# Patient Record
Sex: FEMALE | Race: WHITE | Employment: FULL TIME | URBAN - METROPOLITAN AREA
[De-identification: names, ages, dates, MRNs, and addresses within clinical notes are randomized per-mention and may not be internally consistent; named-entity substitution may affect disease eponyms.]

---

## 2017-02-09 ENCOUNTER — OFFICE VISIT (OUTPATIENT)
Dept: INTERNAL MEDICINE CLINIC | Facility: CLINIC | Age: 57
End: 2017-02-09

## 2017-02-09 VITALS
HEART RATE: 100 BPM | WEIGHT: 150 LBS | SYSTOLIC BLOOD PRESSURE: 100 MMHG | HEIGHT: 60 IN | RESPIRATION RATE: 20 BRPM | BODY MASS INDEX: 29.45 KG/M2 | DIASTOLIC BLOOD PRESSURE: 70 MMHG | TEMPERATURE: 99 F

## 2017-02-09 DIAGNOSIS — R05.9 COUGH: Primary | ICD-10-CM

## 2017-02-09 PROCEDURE — 99213 OFFICE O/P EST LOW 20 MIN: CPT | Performed by: INTERNAL MEDICINE

## 2017-02-09 RX ORDER — HYDROCODONE BITARTRATE AND HOMATROPINE METHYLBROMIDE ORAL SOLUTION 5; 1.5 MG/5ML; MG/5ML
2.5 LIQUID ORAL EVERY 6 HOURS PRN
Qty: 180 ML | Refills: 0 | Status: SHIPPED | OUTPATIENT
Start: 2017-02-09 | End: 2017-02-19

## 2017-02-09 RX ORDER — BENZONATATE 100 MG/1
100 CAPSULE ORAL 3 TIMES DAILY PRN
Qty: 20 CAPSULE | Refills: 0 | Status: SHIPPED | OUTPATIENT
Start: 2017-02-09 | End: 2018-05-23 | Stop reason: ALTCHOICE

## 2017-02-09 NOTE — PROGRESS NOTES
Nichelle Justice is a 55year old female.   HPI:   CC:sore throat /hoarseness for 3 days  Cough loose  Since 2 days  No fever  Pt taking aspirin  Using mucinex OTC  Chicken broth onion and garlic    ALL:  No Known Allergies          Current Outpatient Pre Used                        Alcohol Use: Yes                Comment: 1 drink per week    Family History   Problem Relation Age of Onset   • Heart Attack Father    • High Cholesterol Brother    • Diabetes Brother    • Heart Surgery Sister      heart valve r

## 2017-10-06 RX ORDER — LEVOTHYROXINE SODIUM 88 UG/1
TABLET ORAL
Qty: 90 TABLET | Refills: 0 | Status: SHIPPED | OUTPATIENT
Start: 2017-10-06 | End: 2018-01-11

## 2017-10-07 ENCOUNTER — OFFICE VISIT (OUTPATIENT)
Dept: INTERNAL MEDICINE CLINIC | Facility: CLINIC | Age: 57
End: 2017-10-07

## 2017-10-07 VITALS
SYSTOLIC BLOOD PRESSURE: 104 MMHG | BODY MASS INDEX: 29.64 KG/M2 | WEIGHT: 151 LBS | DIASTOLIC BLOOD PRESSURE: 64 MMHG | HEIGHT: 60 IN | HEART RATE: 72 BPM

## 2017-10-07 DIAGNOSIS — Z00.00 ROUTINE PHYSICAL EXAMINATION: Primary | ICD-10-CM

## 2017-10-07 DIAGNOSIS — Z12.11 COLON CANCER SCREENING: ICD-10-CM

## 2017-10-07 PROCEDURE — 99396 PREV VISIT EST AGE 40-64: CPT | Performed by: INTERNAL MEDICINE

## 2017-10-07 NOTE — PROGRESS NOTES
HPI:   Rosa Elena Sprague is a 55year old female who presents for a complete physical exam.  Lots of stress/feeling sad at times  Taking herba product rhodon?l for mood  Feeling sadness with mom Nov 2016  Maternal aunt August 13, 2017  Paternal uncle kelvin right shoulder   • Hypercholesterolemia    • Hypothyroidism    • Liver lesion 7/13/2011    small hyperechoic lesion w/in the liver    • Loose body, joint 6/28/2009   • Midepigastric pain    • Plantar fasciitis    • Premature menopause    • Primary localize gyne  MUSCULOSKELETAL: denies back pain, no leg pain  NEURO: denies headaches or dizziness  PSYCHE: stable mood feels sad sometime  HEMATOLOGIC: denies hx of anemia  ENDOCRINE: no diabetes or thyroid   ALL/ASTHMA: + hx of allergy     EXAM:   /64 (BP

## 2017-10-27 RX ORDER — PRAVASTATIN SODIUM 40 MG
TABLET ORAL
Qty: 90 TABLET | Refills: 0 | Status: SHIPPED | OUTPATIENT
Start: 2017-10-27 | End: 2018-03-13

## 2017-10-27 NOTE — TELEPHONE ENCOUNTER
Left detailed message for patient, per HIPPA ok. Advised of prescription approval and reminder of labs.

## 2017-10-31 PROBLEM — G57.61 NEUROMA OF SECOND INTERSPACE OF RIGHT FOOT: Status: ACTIVE | Noted: 2017-10-31

## 2017-11-20 RX ORDER — PRAVASTATIN SODIUM 40 MG
40 TABLET ORAL
Qty: 90 TABLET | Refills: 0 | Status: CANCELLED | OUTPATIENT
Start: 2017-11-20

## 2017-11-20 RX ORDER — PRAVASTATIN SODIUM 40 MG
TABLET ORAL
Qty: 90 TABLET | Refills: 0 | OUTPATIENT
Start: 2017-11-20

## 2018-01-11 RX ORDER — LEVOTHYROXINE SODIUM 88 UG/1
TABLET ORAL
Qty: 90 TABLET | Refills: 0 | Status: SHIPPED | OUTPATIENT
Start: 2018-01-11 | End: 2018-03-13

## 2018-01-11 NOTE — TELEPHONE ENCOUNTER
Left detailed message on pt's mobile voice mail advising that curren dose of Levothyroxine 88 mcg is correct. Advised that most recent lab for thyroid done 12/22/17 was normal and dosage change was indicated. Pt asked to call back if questions.

## 2018-01-11 NOTE — TELEPHONE ENCOUNTER
Was patient advised to contact pharmacy directly?:  Yes, patient requested to double check lab results to make sure its the same dose.     Is patient out of meds or supply very low?:  Completely out    Medication Requested:  LEVOTHYROXINE SODIUM 88 MCG Oral

## 2018-03-13 RX ORDER — LEVOTHYROXINE SODIUM 88 UG/1
TABLET ORAL
Qty: 90 TABLET | Refills: 1 | Status: SHIPPED | OUTPATIENT
Start: 2018-03-13 | End: 2018-09-13

## 2018-03-13 RX ORDER — PRAVASTATIN SODIUM 40 MG
40 TABLET ORAL
Qty: 90 TABLET | Refills: 1 | Status: SHIPPED | OUTPATIENT
Start: 2018-03-13 | End: 2018-09-13

## 2018-03-13 NOTE — TELEPHONE ENCOUNTER
Pharmacy was changed to correct one by PSR. Will put through requested medications through the refill pool.

## 2018-03-13 NOTE — TELEPHONE ENCOUNTER
[facility] due to Insurance. Therefore she was only able to get 30 days worth last time instead of 90. Can we please send her levothyroxine and pravastatin to [facility] for 90 days with refills. Thanks.

## 2018-05-23 ENCOUNTER — OFFICE VISIT (OUTPATIENT)
Dept: INTERNAL MEDICINE CLINIC | Facility: CLINIC | Age: 58
End: 2018-05-23

## 2018-05-23 VITALS
HEART RATE: 75 BPM | RESPIRATION RATE: 18 BRPM | TEMPERATURE: 98 F | SYSTOLIC BLOOD PRESSURE: 102 MMHG | WEIGHT: 160 LBS | OXYGEN SATURATION: 98 % | DIASTOLIC BLOOD PRESSURE: 62 MMHG | BODY MASS INDEX: 31 KG/M2

## 2018-05-23 DIAGNOSIS — R05.9 COUGH: Primary | ICD-10-CM

## 2018-05-23 DIAGNOSIS — R53.83 FATIGUE, UNSPECIFIED TYPE: ICD-10-CM

## 2018-05-23 PROCEDURE — 99213 OFFICE O/P EST LOW 20 MIN: CPT | Performed by: INTERNAL MEDICINE

## 2018-05-23 RX ORDER — FLUTICASONE PROPIONATE 50 MCG
SPRAY, SUSPENSION (ML) NASAL
Qty: 1 BOTTLE | Refills: 1 | Status: SHIPPED | OUTPATIENT
Start: 2018-05-23 | End: 2022-01-10

## 2018-05-23 NOTE — PROGRESS NOTES
Sindhu Cortez is a 65year old female. HPI:   CC: cough for 2 weeks  Not sure if allergies?   Stayed home Monday  Feeling better today  Mucinex DM helps  Feeling fatigued  Loose not productive  No fever or chills  nonsmoker  Stress feels anxious   [facility] the inferior turbinate  1970: TONSILLECTOMY   Social History:  Smoking status: Never Smoker                                                              Smokeless tobacco: Never Used                      Alcohol use:  Yes              Comment file.

## 2018-09-12 RX ORDER — LEVOTHYROXINE SODIUM 88 UG/1
TABLET ORAL
Qty: 90 TABLET | Refills: 1 | Status: CANCELLED | OUTPATIENT
Start: 2018-09-12

## 2018-09-12 RX ORDER — PRAVASTATIN SODIUM 40 MG
40 TABLET ORAL
Qty: 90 TABLET | Refills: 1 | Status: CANCELLED | OUTPATIENT
Start: 2018-09-12

## 2018-09-13 ENCOUNTER — TELEPHONE (OUTPATIENT)
Dept: INTERNAL MEDICINE CLINIC | Facility: CLINIC | Age: 58
End: 2018-09-13

## 2018-09-13 RX ORDER — PRAVASTATIN SODIUM 40 MG
40 TABLET ORAL
Qty: 30 TABLET | Refills: 0 | Status: SHIPPED | OUTPATIENT
Start: 2018-09-13 | End: 2018-10-10

## 2018-09-13 RX ORDER — LEVOTHYROXINE SODIUM 88 UG/1
TABLET ORAL
Qty: 30 TABLET | Refills: 0 | Status: SHIPPED | OUTPATIENT
Start: 2018-09-13 | End: 2018-10-18

## 2018-10-10 ENCOUNTER — OFFICE VISIT (OUTPATIENT)
Dept: INTERNAL MEDICINE CLINIC | Facility: CLINIC | Age: 58
End: 2018-10-10
Payer: COMMERCIAL

## 2018-10-10 VITALS
WEIGHT: 154.38 LBS | HEART RATE: 82 BPM | DIASTOLIC BLOOD PRESSURE: 66 MMHG | OXYGEN SATURATION: 97 % | RESPIRATION RATE: 16 BRPM | BODY MASS INDEX: 30.31 KG/M2 | TEMPERATURE: 98 F | SYSTOLIC BLOOD PRESSURE: 100 MMHG | HEIGHT: 60 IN

## 2018-10-10 DIAGNOSIS — Z13.0 SCREENING FOR IRON DEFICIENCY ANEMIA: ICD-10-CM

## 2018-10-10 DIAGNOSIS — Z00.00 ENCOUNTER FOR ANNUAL PHYSICAL EXAM: Primary | ICD-10-CM

## 2018-10-10 DIAGNOSIS — E55.9 VITAMIN D DEFICIENCY: ICD-10-CM

## 2018-10-10 DIAGNOSIS — Z12.11 COLON CANCER SCREENING: ICD-10-CM

## 2018-10-10 DIAGNOSIS — Z13.29 SCREENING FOR THYROID DISORDER: ICD-10-CM

## 2018-10-10 DIAGNOSIS — Z13.220 SCREENING FOR LIPOID DISORDERS: ICD-10-CM

## 2018-10-10 DIAGNOSIS — Z13.228 SCREENING FOR METABOLIC DISORDER: ICD-10-CM

## 2018-10-10 DIAGNOSIS — E78.00 HYPERCHOLESTEROLEMIA: ICD-10-CM

## 2018-10-10 DIAGNOSIS — Z12.39 BREAST CANCER SCREENING: ICD-10-CM

## 2018-10-10 DIAGNOSIS — Z13.89 SCREENING FOR GENITOURINARY CONDITION: ICD-10-CM

## 2018-10-10 DIAGNOSIS — Z13.1 SCREENING FOR DIABETES MELLITUS: ICD-10-CM

## 2018-10-10 PROCEDURE — 99396 PREV VISIT EST AGE 40-64: CPT | Performed by: STUDENT IN AN ORGANIZED HEALTH CARE EDUCATION/TRAINING PROGRAM

## 2018-10-10 RX ORDER — PRAVASTATIN SODIUM 40 MG
40 TABLET ORAL
Qty: 90 TABLET | Refills: 1 | Status: SHIPPED | OUTPATIENT
Start: 2018-10-10 | End: 2019-09-29

## 2018-10-10 NOTE — PATIENT INSTRUCTIONS
Prevention Guidelines, Women Ages 48 to 59  Screening tests and vaccines are an important part of managing your health. A screening test is done to find possible disorders or diseases in people who don't have any symptoms.  The goal is to find a disease e Chlamydia Women at increased risk for infection At routine exams   Colorectal cancer All women in this age group Flexible sigmoidoscopy every 5 years, or colonoscopy every 10 years, or double-contrast barium enema every 5 years; yearly fecal occult blood t Hepatitis B Women at increased risk for infection – talk with your healthcare provider 3 doses over 6 months; second dose should be given 1 month after the first dose; the third dose should be given at least 2 months after the second dose and at least 4 mo Use of daily aspirin Women ages 54 and up in this age group who are at risk for cardiovascular health problems such as stroke When your risk is known   Use of tobacco and the health effects it can cause All women in this age group Every exam   1Amerkade Ca

## 2018-10-10 NOTE — PROGRESS NOTES
[facility]    REASON FOR VISIT:    Kendrick Prieto is a 65year old female who presents for an [address]. Current Complaints:  Reports compliance with the medications, denies any side effects  Vaccinations:  Tdap - cannot reca factors No results found for: A1C  GLUCOSE (mg/dL)   Date Value   12/22/2017 94         Gonorrhea Screening if high risk No results found for: GONOCOCCUS    HIV Screening For all adults age 22-65, older adults at increased risk No results found for: HIV through the tibial sesamoid area   • Torn rotator cuff 10/14, approx    left shoulder   • Vertigo       Past Surgical History:   Procedure Laterality Date   • LAPAROSCOPY PROCEDURE UNLISTED      multiple laproscopic surgeries for fibroids   • SINUS SURGERY Encounters:  10/10/18 : 154 lb 6.4 oz  05/23/18 : 160 lb  10/07/17 : 151 lb  02/09/17 : 150 lb  12/15/16 : 150 lb  08/18/16 : 148 lb 6 oz    Body mass index is 30.15 kg/m². No LMP recorded.  Patient is postmenopausal.     Constitutional: She appears her st today for physical.    Diagnoses and all orders for this visit:    Encounter for annual physical exam  Body mass index is Body mass index is 30.15 kg/m². Recommended low fat diet and aerobic exercise 30 minutes three times weekly.   Health maintenance: artemio barriers to learning. Medical education done. Educated by: MD   The patient indicates understanding of these issues and agrees to the plan.     Diet counseling perfomed  Exercise counseling perfomed  Endometrial cancer awareness counseling performed

## 2018-10-18 RX ORDER — LEVOTHYROXINE SODIUM 88 UG/1
TABLET ORAL
Qty: 90 TABLET | Refills: 0 | Status: SHIPPED | OUTPATIENT
Start: 2018-10-18 | End: 2018-11-12

## 2018-10-24 ENCOUNTER — TELEPHONE (OUTPATIENT)
Dept: INTERNAL MEDICINE CLINIC | Facility: CLINIC | Age: 58
End: 2018-10-24

## 2018-10-24 NOTE — TELEPHONE ENCOUNTER
Patient called, she needs to use [facility] as her preferred lab. PSR did update in chart for next time. This time PSR printed the 7 orders and faxed to Shaquille: [phone]. Fax confirmed.

## 2018-10-30 LAB
AMB EXT BILIRUBIN URINE: NEGATIVE
AMB EXT BLOOD URINE: NEGATIVE
AMB EXT CHOLESTEROL, TOTAL: 224 MG/DL
AMB EXT CREATININE: 0.72 MG/DL
AMB EXT GLUCOSE URINE: NEGATIVE
AMB EXT GLUCOSE: 98 MG/DL
AMB EXT HDL CHOLESTEROL: 47 MG/DL
AMB EXT HEMATOCRIT: 39.7
AMB EXT HEMOGLOBIN: 13.3
AMB EXT HGBA1C: 5.9 %
AMB EXT KETONES URINE: NEGATIVE
AMB EXT LDL CHOLESTEROL, DIRECT: 144 MG/DL
AMB EXT MCV: 87
AMB EXT NITRITE URINE: NEGATIVE
AMB EXT PH URINE: 7
AMB EXT PLATELETS: 272
AMB EXT TRIGLYCERIDES: 163 MG/DL
AMB EXT TSH: 1 MIU/ML
AMB EXT URINE CLARITY: CLEAR
AMB EXT URINE COLOR: YELLOW
AMB EXT URINE SPECIFIC GRAVITY: 1.02
AMB EXT UROBILINOGEN URINE: 0.2
AMB EXT VLDL: 33 MG/DL
AMB EXT WBC URINE: >20
AMB EXT WBC: 6.1 X10(3)UL

## 2018-10-31 ENCOUNTER — TELEPHONE (OUTPATIENT)
Dept: INTERNAL MEDICINE CLINIC | Facility: CLINIC | Age: 58
End: 2018-10-31

## 2018-10-31 DIAGNOSIS — E55.9 VITAMIN D DEFICIENCY: Primary | ICD-10-CM

## 2018-10-31 NOTE — TELEPHONE ENCOUNTER
Received test results from [address] [place] [address].  To Dr Josh Scott for review.    Entered into external result console

## 2018-11-05 RX ORDER — ERGOCALCIFEROL 1.25 MG/1
50000 CAPSULE ORAL WEEKLY
Qty: 12 CAPSULE | Refills: 0 | Status: SHIPPED | OUTPATIENT
Start: 2018-11-05 | End: 2019-11-04 | Stop reason: ALTCHOICE

## 2018-11-05 NOTE — TELEPHONE ENCOUNTER
Spoke to pt, aware of results. Denies symptoms of UTI. She does forget to pravastatin at times. Wants to hold off on increasing dose. Vitamin D called in.

## 2018-11-05 NOTE — TELEPHONE ENCOUNTER
Results received and reviewed. No anemia, normal liver and kidney function, has increased WBC on urinalysis, please check if has any UTI symptoms. Hemoglobin A1c is 5.9 which is in prediabetic range.   Recommend low-fat, low-carb diet and regular exercise

## 2018-11-06 NOTE — TELEPHONE ENCOUNTER
Incoming (mail or fax):   Fax  Received from: [facility]  Documentation given to: Triage ( test results bin)

## 2018-11-06 NOTE — TELEPHONE ENCOUNTER
[phone] Spoke to Willernie. TSH was 0.997. Report to be faxed now. External result console updated.

## 2018-11-08 NOTE — TELEPHONE ENCOUNTER
TSH stable. Continue current management- ok to release medication as ordered by Dr. Kip Sarah. Thanks.

## 2018-11-08 NOTE — TELEPHONE ENCOUNTER
Patient is following up on the lab result for her thyroid. She is concerned because she will be out of her LEVOTHYROXINE SODIUM 88 MCG Oral Tab tomorrow. [facility] [address]. Please advise. Thank you!

## 2018-11-08 NOTE — TELEPHONE ENCOUNTER
Dr. Sadi Carnes was waiting for the Thyroid Lab from [facility], it has resulted, please see in lab results. Patient will run out of medication tomorrow, I can see that the medication was sent to pharmacy on 10/18/18, but was put on hold.  Ok to release medicatio

## 2018-11-08 NOTE — TELEPHONE ENCOUNTER
Spoke to patient, aware prescription can be picked up at pharmacy. Spoke to pharmacy, okay to release Levothyroxine for .

## 2018-11-12 RX ORDER — LEVOTHYROXINE SODIUM 88 UG/1
TABLET ORAL
Qty: 90 TABLET | Refills: 1 | Status: SHIPPED | OUTPATIENT
Start: 2018-11-12 | End: 2019-08-23

## 2018-11-12 NOTE — TELEPHONE ENCOUNTER
[id number] Rosmery Merino with keeping the same dose since she was non-complaint.  Please, emphasize the proper medication regimen, Thanks

## 2018-11-12 NOTE — TELEPHONE ENCOUNTER
Additional labs:  HbA1C is 5.9 and is in prediabetic range. Lipid panel: elevated total, LDL cholesterols and triglycerides. Please, check if pt is taking her Pravastatin 40 mg daily. If yes - need to increase tracy to 80 mg due to uncontrolled HLD.   Low V

## 2018-11-12 NOTE — TELEPHONE ENCOUNTER
Spoke with pt at length, reviewed labs again including TSH. Could not hear pt near end of call, all information had been reviewed, not sure if she could hear me, asked her to call back if with any further questions.     Re-sent levothyroxine per protocol pe

## 2018-11-19 ENCOUNTER — OFFICE VISIT (OUTPATIENT)
Dept: INTERNAL MEDICINE CLINIC | Facility: CLINIC | Age: 58
End: 2018-11-19
Payer: COMMERCIAL

## 2018-11-19 ENCOUNTER — TELEPHONE (OUTPATIENT)
Dept: INTERNAL MEDICINE CLINIC | Facility: CLINIC | Age: 58
End: 2018-11-19

## 2018-11-19 VITALS
TEMPERATURE: 98 F | HEIGHT: 60 IN | DIASTOLIC BLOOD PRESSURE: 60 MMHG | OXYGEN SATURATION: 98 % | HEART RATE: 68 BPM | RESPIRATION RATE: 16 BRPM | WEIGHT: 149 LBS | SYSTOLIC BLOOD PRESSURE: 110 MMHG | BODY MASS INDEX: 29.25 KG/M2

## 2018-11-19 DIAGNOSIS — J20.9 ACUTE BRONCHITIS, UNSPECIFIED ORGANISM: Primary | ICD-10-CM

## 2018-11-19 DIAGNOSIS — F41.9 ANXIETY: ICD-10-CM

## 2018-11-19 PROCEDURE — 99214 OFFICE O/P EST MOD 30 MIN: CPT | Performed by: STUDENT IN AN ORGANIZED HEALTH CARE EDUCATION/TRAINING PROGRAM

## 2018-11-19 RX ORDER — BENZONATATE 100 MG/1
100 CAPSULE ORAL 3 TIMES DAILY PRN
Qty: 30 CAPSULE | Refills: 0 | Status: SHIPPED | OUTPATIENT
Start: 2018-11-19 | End: 2019-11-04 | Stop reason: ALTCHOICE

## 2018-11-19 RX ORDER — ALPRAZOLAM 0.25 MG/1
0.25 TABLET ORAL 2 TIMES DAILY PRN
Qty: 30 TABLET | Refills: 0 | Status: SHIPPED | OUTPATIENT
Start: 2018-11-19 | End: 2019-11-04 | Stop reason: ALTCHOICE

## 2018-11-19 NOTE — TELEPHONE ENCOUNTER
Spoke with pt. Has had cough x1 month, got better, then in past few days cough returned, dry, throat feels raw, tired. Denies wheezing, chills, sob. No history asthma/copd. Does not think has fever. Talking sometimes triggers coughing fits.  appt tonight

## 2018-11-19 NOTE — TELEPHONE ENCOUNTER
Patient scheduled an appt today with Dr Cathi Abrams at 7:00 for a \"barking\" cough. Should she be seen sooner?

## 2018-11-20 NOTE — PROGRESS NOTES
HPI:    Patient ID: Miriam Carolina is a 65year old female. Cough   This is a chronic problem. The current episode started 1 to 4 weeks ago. The problem has been waxing and waning. The problem occurs hourly. The cough is non-productive.  Associated s anorexia, change in bowel habit, heartburn and vomiting. Genitourinary: Negative. Musculoskeletal: Negative. Negative for arthralgias, myalgias and neck pain. Skin: Negative. Negative for rash. Neurological: Negative.   Negative for vertigo, weak Oropharynx is clear and moist.   Eyes: Conjunctivae and EOM are normal. Pupils are equal, round, and reactive to light. Neck: Normal range of motion. Neck supple.    Cardiovascular: Normal rate, regular rhythm, normal heart sounds and intact distal pulses tablet 0     Sig: Take 1 tablet (0.25 mg total) by mouth 2 (two) times daily as needed for Anxiety.        Imaging & Referrals:  None        Mariya Reardon MD

## 2019-08-23 DIAGNOSIS — E03.9 ACQUIRED HYPOTHYROIDISM: Primary | ICD-10-CM

## 2019-08-23 RX ORDER — LEVOTHYROXINE SODIUM 88 UG/1
88 TABLET ORAL
Qty: 90 TABLET | Refills: 0 | Status: SHIPPED | OUTPATIENT
Start: 2019-08-23 | End: 2019-11-11

## 2019-09-29 DIAGNOSIS — E78.00 HYPERCHOLESTEROLEMIA: ICD-10-CM

## 2019-09-30 RX ORDER — PRAVASTATIN SODIUM 40 MG
TABLET ORAL
Qty: 30 TABLET | Refills: 0 | Status: SHIPPED | OUTPATIENT
Start: 2019-09-30 | End: 2019-11-11

## 2019-09-30 NOTE — TELEPHONE ENCOUNTER
Last OV relevant to medication: 11/19/18  Last refill date: 10/10/18     #/refills: 90/1  When pt was asked to return for OV: 4 weeks  Upcoming appt/reason: 11/4/19, PE    Lab Results   Component Value Date    GLU 98 10/30/2018    BUN 13 12/22/2017    SHAKIR

## 2019-11-04 ENCOUNTER — OFFICE VISIT (OUTPATIENT)
Dept: INTERNAL MEDICINE CLINIC | Facility: CLINIC | Age: 59
End: 2019-11-04
Payer: COMMERCIAL

## 2019-11-04 VITALS
HEART RATE: 50 BPM | TEMPERATURE: 98 F | DIASTOLIC BLOOD PRESSURE: 60 MMHG | OXYGEN SATURATION: 98 % | RESPIRATION RATE: 14 BRPM | SYSTOLIC BLOOD PRESSURE: 94 MMHG | WEIGHT: 122.63 LBS | BODY MASS INDEX: 24.07 KG/M2 | HEIGHT: 60 IN

## 2019-11-04 DIAGNOSIS — Z13.29 SCREENING FOR THYROID DISORDER: ICD-10-CM

## 2019-11-04 DIAGNOSIS — Z13.0 SCREENING FOR IRON DEFICIENCY ANEMIA: ICD-10-CM

## 2019-11-04 DIAGNOSIS — Z13.228 SCREENING FOR METABOLIC DISORDER: ICD-10-CM

## 2019-11-04 DIAGNOSIS — Z13.1 SCREENING FOR DIABETES MELLITUS: ICD-10-CM

## 2019-11-04 DIAGNOSIS — Z00.00 ENCOUNTER FOR ANNUAL PHYSICAL EXAM: Primary | ICD-10-CM

## 2019-11-04 DIAGNOSIS — Z12.31 BREAST CANCER SCREENING BY MAMMOGRAM: ICD-10-CM

## 2019-11-04 DIAGNOSIS — Z23 NEED FOR VACCINATION: ICD-10-CM

## 2019-11-04 DIAGNOSIS — Z13.220 SCREENING FOR LIPOID DISORDERS: ICD-10-CM

## 2019-11-04 DIAGNOSIS — Z12.11 COLON CANCER SCREENING: ICD-10-CM

## 2019-11-04 PROCEDURE — 90686 IIV4 VACC NO PRSV 0.5 ML IM: CPT | Performed by: STUDENT IN AN ORGANIZED HEALTH CARE EDUCATION/TRAINING PROGRAM

## 2019-11-04 PROCEDURE — 90471 IMMUNIZATION ADMIN: CPT | Performed by: STUDENT IN AN ORGANIZED HEALTH CARE EDUCATION/TRAINING PROGRAM

## 2019-11-04 PROCEDURE — 99396 PREV VISIT EST AGE 40-64: CPT | Performed by: STUDENT IN AN ORGANIZED HEALTH CARE EDUCATION/TRAINING PROGRAM

## 2019-11-04 NOTE — PROGRESS NOTES
[facility]    REASON FOR VISIT:    Jose Alberto Torres is a 63year old female who presents for an [address]. Current Complaints: feeling well. Reports compliance with the medications, denies any side effects  Vaccinations:  Tdap Recommended screening varies with age, risk and gender LDL-CHOLESTEROL (mg/dL (calc))   Date Value   12/22/2017 137 (H)       Diabetes Screening  if history of high blood pressure or other  risk factors HgbA1C (%)   Date Value   10/31/2018 5.9     Glucose Midepigastric pain    • Plantar fasciitis    • Premature menopause    • Primary localized osteoarthrosis, upper arm 2/12/2009   • Sesamoiditis 5/19/2010    tibial   • Tibia fracture 11/10/2009    X-rays taken reveal a hairline fx through the tibial sesamoi confusion and agitation. The patient is not nervous/anxious.     EXAM:   BP 94/60 (BP Location: Left arm, Patient Position: Sitting, Cuff Size: adult)   Pulse 50   Temp 98.4 °F (36.9 °C) (Oral)   Resp 14   Ht 60\"   Wt 122 lb 9.6 oz (55.6 kg)   SpO2 98%   B is warm. No rash noted. No erythema.    Psychiatric: She has a normal mood and affect and her behavior is normal.     ASSESSMENT AND PLAN WITH OTHER RELEVANT CHRONIC CONDITIONS   Remedios Farley is a 63year old female who presents for an [address] IMMUNOASSAY      CBC W Differential W Platelet [E]      Comp Metabolic Panel (14) [E]      Hemoglobin A1C [E]      Lipid Panel [E]      TSH W Reflex To Free T4 [E]      Imaging & Consults:  TOSIN CESPEDES 2D+3D SCREENING BILAT (CPT=77067/28625)       Continue he

## 2019-11-04 NOTE — PATIENT INSTRUCTIONS
Prevention Guidelines, Women Ages 48 to 59  Screening tests and vaccines are an important part of managing your health. A screening test is done to find possible disorders or diseases in people who don't have any symptoms.  The goal is to find a disea Chlamydia Women at increased risk for infection At routine exams   Colorectal cancer All women in this age group Flexible sigmoidoscopy every 5 years, or colonoscopy every 10 years, or double-contrast barium enema every 5 years; yearly fecal occult blood t Hepatitis B Women at increased risk for infection – talk with your healthcare provider 3 doses over 6 months; second dose should be given 1 month after the first dose; the third dose should be given at least 2 months after the second dose and at least 4 mo Use of daily aspirin Women ages 54 and up in this age group who are at risk for cardiovascular health problems such as stroke When your risk is known   Use of tobacco and the health effects it can cause All women in this age group Every exam   1Amerk[address] Ca

## 2019-11-07 ENCOUNTER — TELEPHONE (OUTPATIENT)
Dept: INTERNAL MEDICINE CLINIC | Facility: CLINIC | Age: 59
End: 2019-11-07

## 2019-11-07 NOTE — TELEPHONE ENCOUNTER
Received OV note, Pap smear/HPV, mammogram and bone density testing results from Dr. Rodger Renee office. HM updated, records placed for Dr. Carmine Soulier review.

## 2019-11-07 NOTE — TELEPHONE ENCOUNTER
Incoming (mail or fax):  Fax  Received from: Tania Andrade   Documentation given to: Triage  ([address])

## 2019-11-11 DIAGNOSIS — E78.00 HYPERCHOLESTEROLEMIA: ICD-10-CM

## 2019-11-11 DIAGNOSIS — E03.9 ACQUIRED HYPOTHYROIDISM: ICD-10-CM

## 2019-11-11 RX ORDER — PRAVASTATIN SODIUM 40 MG
40 TABLET ORAL
Qty: 90 TABLET | Refills: 3 | Status: SHIPPED | OUTPATIENT
Start: 2019-11-11 | End: 2021-07-15

## 2019-11-11 RX ORDER — LEVOTHYROXINE SODIUM 88 UG/1
88 TABLET ORAL
Qty: 90 TABLET | Refills: 3 | Status: SHIPPED | OUTPATIENT
Start: 2019-11-11 | End: 2020-12-11

## 2019-11-16 ENCOUNTER — APPOINTMENT (OUTPATIENT)
Dept: LAB | Facility: HOSPITAL | Age: 59
End: 2019-11-16
Attending: STUDENT IN AN ORGANIZED HEALTH CARE EDUCATION/TRAINING PROGRAM
Payer: COMMERCIAL

## 2019-11-16 PROCEDURE — 82274 ASSAY TEST FOR BLOOD FECAL: CPT | Performed by: STUDENT IN AN ORGANIZED HEALTH CARE EDUCATION/TRAINING PROGRAM

## 2020-09-23 ENCOUNTER — OFFICE VISIT (OUTPATIENT)
Dept: INTERNAL MEDICINE CLINIC | Facility: CLINIC | Age: 60
End: 2020-09-23
Payer: COMMERCIAL

## 2020-09-23 VITALS
OXYGEN SATURATION: 99 % | TEMPERATURE: 98 F | RESPIRATION RATE: 16 BRPM | BODY MASS INDEX: 24.81 KG/M2 | HEIGHT: 60 IN | DIASTOLIC BLOOD PRESSURE: 60 MMHG | SYSTOLIC BLOOD PRESSURE: 100 MMHG | HEART RATE: 80 BPM | WEIGHT: 126.38 LBS

## 2020-09-23 DIAGNOSIS — Z00.00 ENCOUNTER FOR ANNUAL PHYSICAL EXAM: Primary | ICD-10-CM

## 2020-09-23 DIAGNOSIS — Z13.29 SCREENING FOR THYROID DISORDER: ICD-10-CM

## 2020-09-23 DIAGNOSIS — F51.04 PSYCHOPHYSIOLOGICAL INSOMNIA: ICD-10-CM

## 2020-09-23 DIAGNOSIS — Z13.220 SCREENING FOR LIPOID DISORDERS: ICD-10-CM

## 2020-09-23 DIAGNOSIS — Z13.0 SCREENING FOR IRON DEFICIENCY ANEMIA: ICD-10-CM

## 2020-09-23 DIAGNOSIS — Z13.1 SCREENING FOR DIABETES MELLITUS: ICD-10-CM

## 2020-09-23 DIAGNOSIS — Z12.31 ENCOUNTER FOR SCREENING MAMMOGRAM FOR BREAST CANCER: ICD-10-CM

## 2020-09-23 DIAGNOSIS — Z12.11 COLON CANCER SCREENING: ICD-10-CM

## 2020-09-23 DIAGNOSIS — Z13.228 SCREENING FOR METABOLIC DISORDER: ICD-10-CM

## 2020-09-23 PROCEDURE — 3008F BODY MASS INDEX DOCD: CPT | Performed by: STUDENT IN AN ORGANIZED HEALTH CARE EDUCATION/TRAINING PROGRAM

## 2020-09-23 PROCEDURE — 3074F SYST BP LT 130 MM HG: CPT | Performed by: STUDENT IN AN ORGANIZED HEALTH CARE EDUCATION/TRAINING PROGRAM

## 2020-09-23 PROCEDURE — 99396 PREV VISIT EST AGE 40-64: CPT | Performed by: STUDENT IN AN ORGANIZED HEALTH CARE EDUCATION/TRAINING PROGRAM

## 2020-09-23 PROCEDURE — 3078F DIAST BP <80 MM HG: CPT | Performed by: STUDENT IN AN ORGANIZED HEALTH CARE EDUCATION/TRAINING PROGRAM

## 2020-09-23 RX ORDER — ZOLPIDEM TARTRATE 5 MG/1
5 TABLET ORAL NIGHTLY PRN
Qty: 30 TABLET | Refills: 0 | Status: SHIPPED | OUTPATIENT
Start: 2020-09-23 | End: 2020-12-11 | Stop reason: ALTCHOICE

## 2020-09-23 NOTE — PATIENT INSTRUCTIONS
Prevention Guidelines, Women Ages 48 to [facility]  Screening tests and vaccines are an important part of managing your health. A screening test is done to find possible disorders or diseases in people who don't have any symptoms.  The goal is to find a disease e Colorectal cancer All women at average risk in this age group Multiple tests are available and are used at different times.  Possible tests include:  · Flexible sigmoidoscopy every 5 years, or  · Colonoscopy every 10 years, or  · CT colonography (virtual co Chickenpox (varicella) All women in this age group who have no record of this infection or vaccine 2 doses; the second dose should be given at least 4 weeks after the first dose   Hepatitis A Women at increased risk for infection – talk with your healthcar Diet and exercise Women who are overweight or obese When diagnosed, and then at routine exams   Sexually transmitted infection prevention Women at increased risk for infection – talk with your healthcare provider At routine exams   Use of daily aspirin Wom Side effects that you should report to your doctor or health care professional as soon as possible:  · allergic reactions like skin rash, itching or hives, swelling of the face, lips, or tongue  · breathing problems  · changes in vision  · confusion  · dep This does not apply. This medicine should only be taken immediately before going to sleep. Do not take double or extra doses. Where should I keep my medicine? Keep out of the reach of children. This medicine can be abused.  Keep your medicine in a safe pl After taking this medicine, you may get up out of bed and do an activity that you do not know you are doing. The next morning, you may have no memory of this.  Activities include driving a car (\"sleep-driving\"), making and eating food, talking on the phon

## 2020-09-23 NOTE — PROGRESS NOTES
[facility]    REASON FOR VISIT:    Mary Abreu is a 63year old female who presents for an [address]. Current Complaints: feeling well. Reports compliance with the medications, denies any side effects.   Reports insomnia, Lab or Procedure   Cholesterol Screening Recommended screening varies with age, risk and gender LDL-CHOLESTEROL (mg/dL (calc))   Date Value   11/08/2019 89       Diabetes Screening  if history of high blood pressure or other  risk factors HEMOGLOBIN A1c (% Hypothyroidism    • Liver lesion 7/13/2011    small hyperechoic lesion w/in the liver    • Loose body, joint 6/28/2009   • Midepigastric pain    • Plantar fasciitis    • Premature menopause    • Primary localized osteoarthrosis, upper arm 2/12/2009   • Ses numbness. Hematological: Negative for adenopathy. Does not bruise/bleed easily. Psychiatric/Behavioral: Negative for confusion and agitation. The patient is not nervous/anxious.     EXAM:   /60 (BP Location: Left arm, Patient Position: Sitting, Cu intact. She has normal reflexes. Skin: Skin is warm. No rash noted. No erythema.    Psychiatric: She has a normal mood and affect and her behavior is normal.     ASSESSMENT AND PLAN WITH OTHER RELEVANT CHRONIC CONDITIONS   Christel Cao is a 63 year disorder  -     TSH W REFLEX TO FREE T4    Colon cancer screening  -     GASTRO - INTERNAL  -     OCCULT BLOOD, FECAL, IMMUNOASSAY    Psychophysiological insomnia   Insomnia - proper sleep hygiene was discussed: Regular sleep schedule,  no exercise before Influenza Annually   Pneumococcal if high risk   Td/Tdap once then every 10 years   HPV Females 11-26: 3 doses   Zoster (Shingles) 60 and older: one dose   Varicella 2 doses if not immune   MMR 1-2 doses if born after 1955 and not immune     ARTUR HYATT

## 2020-10-20 ENCOUNTER — HOSPITAL ENCOUNTER (OUTPATIENT)
Dept: MAMMOGRAPHY | Facility: HOSPITAL | Age: 60
Discharge: HOME | End: 2020-10-20
Attending: STUDENT IN AN ORGANIZED HEALTH CARE EDUCATION/TRAINING PROGRAM
Payer: COMMERCIAL

## 2020-10-20 DIAGNOSIS — Z12.31 ENCOUNTER FOR SCREENING MAMMOGRAM FOR BREAST CANCER: ICD-10-CM

## 2020-10-20 PROCEDURE — 77067 SCR MAMMO BI INCL CAD: CPT | Performed by: STUDENT IN AN ORGANIZED HEALTH CARE EDUCATION/TRAINING PROGRAM

## 2020-10-20 PROCEDURE — 77063 BREAST TOMOSYNTHESIS BI: CPT | Performed by: STUDENT IN AN ORGANIZED HEALTH CARE EDUCATION/TRAINING PROGRAM

## 2020-10-27 DIAGNOSIS — E03.9 ACQUIRED HYPOTHYROIDISM: ICD-10-CM

## 2020-10-27 DIAGNOSIS — E78.00 HYPERCHOLESTEROLEMIA: Primary | ICD-10-CM

## 2020-10-28 ENCOUNTER — TELEPHONE (OUTPATIENT)
Dept: INTERNAL MEDICINE CLINIC | Facility: CLINIC | Age: 60
End: 2020-10-28

## 2020-10-28 NOTE — TELEPHONE ENCOUNTER
Spoke with pt to discuss her lab results (see result note) and follow up on her insomnia. TSH was elevated:  Pt does take biotin 2000mcg (hair, skin, nails supplement) - not daily but doesn't know if has taken within past 4 days.  Advised should have TSH 10/26/2020    BUN 16 10/26/2020    CREATSERUM 0.73 10/26/2020    GFRNAA 91 10/26/2020    GFRAA 105 10/26/2020    CA 10.2 10/26/2020    ALKPHO 63 10/26/2020    AST 23 10/26/2020    ALT 18 10/26/2020    BILT 0.6 10/26/2020    TP 7.3 10/26/2020    ALB 4.8 10/

## 2020-10-29 ENCOUNTER — IMMUNIZATION (OUTPATIENT)
Dept: INTERNAL MEDICINE CLINIC | Facility: CLINIC | Age: 60
End: 2020-10-29
Payer: COMMERCIAL

## 2020-10-29 DIAGNOSIS — Z23 NEED FOR VACCINATION: ICD-10-CM

## 2020-10-29 PROCEDURE — 90686 IIV4 VACC NO PRSV 0.5 ML IM: CPT | Performed by: STUDENT IN AN ORGANIZED HEALTH CARE EDUCATION/TRAINING PROGRAM

## 2020-10-29 PROCEDURE — 90471 IMMUNIZATION ADMIN: CPT | Performed by: STUDENT IN AN ORGANIZED HEALTH CARE EDUCATION/TRAINING PROGRAM

## 2020-10-29 NOTE — TELEPHONE ENCOUNTER
Left detailed msg for pt, okay per hipaa consent. Advised of Dr Val Conn message. Pt to schedule VV to follow up on her insomnia. Pt to stop all biotin supplments x4 days then retest TSH. Pt may postpone Lipid panel, continue to work on diet/exercise.

## 2020-10-29 NOTE — TELEPHONE ENCOUNTER
Noted below. Yes to VV for insomnia. TSH to be rechecked now off biotin as you wrote in the note below. Ok to wait with lipid recheck. Thank you!

## 2020-12-04 ENCOUNTER — TELEPHONE (OUTPATIENT)
Dept: INTERNAL MEDICINE CLINIC | Facility: CLINIC | Age: 60
End: 2020-12-04

## 2020-12-04 NOTE — TELEPHONE ENCOUNTER
Doximity Video Visit Consent    Mercedes Hardy verbally {consents to a Doximity Video Visit Check-In service on 12/11/20  Patient understands that we are using a different platform that may not be as secure as our traditional telehealth platform.  Vikram Bermudez

## 2020-12-11 ENCOUNTER — PATIENT MESSAGE (OUTPATIENT)
Dept: INTERNAL MEDICINE CLINIC | Facility: CLINIC | Age: 60
End: 2020-12-11

## 2020-12-11 NOTE — PROGRESS NOTES
Virtual Telephone Check-In    Marcia Golden verbally consents to a Virtual/Telephone Check-In visit on 12/11/20. Patient has been referred to the [facility] website at [email or web address] to review the yearly Consent to Treat document.     Patient dottye PAST MEDICAL, SOCIAL  AND FAMILY HISTORY:  Tobacco:     Smoking status: Never Smoker   Smokeless tobacco: Never Used     Family History   Problem Relation Age of Onset   • Heart Attack Father    • Other (Other) Mother 80        aplastic anemia   • Maryfrances Craw atraumatic. Eyes: EOM are normal.  No scleral icterus. Mouth: moist mucous membranes  Neck: Normal range of motion. No thyromegaly present. No cervical lymphadenopathy. Pulmonary/Chest: Effort normal, no wheezes heard.  .   Musculoskeletal: Normal rang visitation. There are limitations of this visit as no complete physical exam could be performed. Every conscious effort was taken to allow for sufficient and adequate time.   This billing was spent on reviewing labs, medications, radiology tests and decis

## 2020-12-11 NOTE — TELEPHONE ENCOUNTER
From: Guanako Ramsey  To: Cristhian Camp MD  Sent: 12/11/2020 7:35 AM CST  Subject: Other    Hi Dr Micky Zayas - I'm set up for 7:30 am televisit today. How do we connect? My Philly Singh is [phone].      Skjabari Ramsey

## 2021-02-25 DIAGNOSIS — F51.04 PSYCHOPHYSIOLOGICAL INSOMNIA: ICD-10-CM

## 2021-02-25 RX ORDER — ALPRAZOLAM 0.5 MG/1
0.5 TABLET ORAL NIGHTLY PRN
Qty: 30 TABLET | Refills: 0 | Status: SHIPPED | OUTPATIENT
Start: 2021-02-25 | End: 2021-07-17 | Stop reason: ALTCHOICE

## 2021-03-24 NOTE — TELEPHONE ENCOUNTER
Last OV relevant to medication: 12/11/2020  Last refill date:12/11/2020     #/refills:30-0  When pt was asked to return for OV: prn if symptoms worsen  Upcoming appt/reason: None scheduled  Was pt informed of any over due labs:  No Mid-Level Procedure Text (A): After obtaining clear surgical margins the patient was sent to a mid-level provider for surgical repair.  The patient understands they will receive post-surgical care and follow-up from the mid-level provider.

## 2021-04-26 NOTE — PROGRESS NOTES
[facility]:  Printed copy of letter sent to pt on 1/26/21 via [employer]. Wrote \"2nd Request\" across the top and mailed out today.

## 2021-07-14 ENCOUNTER — TELEPHONE (OUTPATIENT)
Dept: INTERNAL MEDICINE CLINIC | Facility: CLINIC | Age: 61
End: 2021-07-14

## 2021-07-14 DIAGNOSIS — E03.9 ACQUIRED HYPOTHYROIDISM: ICD-10-CM

## 2021-07-14 DIAGNOSIS — E78.00 HYPERCHOLESTEROLEMIA: ICD-10-CM

## 2021-07-14 NOTE — TELEPHONE ENCOUNTER
Did the patient contact the pharmacy directly?:  No    Is patient out of meds or supply very low?:  Very Low Supply     Medication Requested:  Pravastatin Sodium 40 MG Oral Tab    Dose:      Is patient requesting a 30 or 90 day supply?:  90 day     Pharmac

## 2021-07-14 NOTE — TELEPHONE ENCOUNTER
Pt called and stated that they are currently experencing neck and shoulder pain, which has been on going issue and has been seeing a therapist. Pt asked their therapist if a medication could be prescribed cause the pain causes issues when the pt tries to s

## 2021-07-14 NOTE — TELEPHONE ENCOUNTER
Did the patient contact the pharmacy directly?:  No.      Is patient out of meds or supply very low?: Low supply     Medication Requested:  Levothyroxine Sodium 88 MCG Oral Tab    Is patient requesting a 30 or 90 day supply?:  90 day supply     Pharmacy na

## 2021-07-15 RX ORDER — PRAVASTATIN SODIUM 40 MG
40 TABLET ORAL
Qty: 90 TABLET | Refills: 0 | Status: SHIPPED | OUTPATIENT
Start: 2021-07-15 | End: 2021-10-08

## 2021-07-15 RX ORDER — LEVOTHYROXINE SODIUM 88 UG/1
88 TABLET ORAL
Qty: 90 TABLET | Refills: 0 | Status: SHIPPED | OUTPATIENT
Start: 2021-07-15 | End: 2021-10-22

## 2021-07-15 NOTE — TELEPHONE ENCOUNTER
Noted below. We cannot give the pain medication or muscle relaxers without seeing the patient. I see she is scheduled Saturday. This will be discussed with her further at that time.   You can advise her in the meantime she can rotate Tylenol and ibuprofe

## 2021-07-17 ENCOUNTER — OFFICE VISIT (OUTPATIENT)
Dept: INTERNAL MEDICINE CLINIC | Facility: CLINIC | Age: 61
End: 2021-07-17
Payer: COMMERCIAL

## 2021-07-17 VITALS
BODY MASS INDEX: 25.72 KG/M2 | TEMPERATURE: 98 F | HEART RATE: 78 BPM | WEIGHT: 131 LBS | DIASTOLIC BLOOD PRESSURE: 68 MMHG | SYSTOLIC BLOOD PRESSURE: 118 MMHG | HEIGHT: 60 IN | RESPIRATION RATE: 14 BRPM | OXYGEN SATURATION: 98 %

## 2021-07-17 DIAGNOSIS — M25.512 ACUTE PAIN OF LEFT SHOULDER: Primary | ICD-10-CM

## 2021-07-17 DIAGNOSIS — F51.04 PSYCHOPHYSIOLOGICAL INSOMNIA: ICD-10-CM

## 2021-07-17 PROCEDURE — 99214 OFFICE O/P EST MOD 30 MIN: CPT | Performed by: NURSE PRACTITIONER

## 2021-07-17 PROCEDURE — 3078F DIAST BP <80 MM HG: CPT | Performed by: NURSE PRACTITIONER

## 2021-07-17 PROCEDURE — 3008F BODY MASS INDEX DOCD: CPT | Performed by: NURSE PRACTITIONER

## 2021-07-17 PROCEDURE — 3074F SYST BP LT 130 MM HG: CPT | Performed by: NURSE PRACTITIONER

## 2021-07-17 RX ORDER — ALPRAZOLAM 0.5 MG/1
0.5 TABLET ORAL NIGHTLY PRN
Qty: 90 TABLET | Refills: 0 | Status: SHIPPED | OUTPATIENT
Start: 2021-07-17 | End: 2022-01-27

## 2021-07-17 RX ORDER — CYCLOBENZAPRINE HCL 5 MG
5 TABLET ORAL 3 TIMES DAILY PRN
Qty: 30 TABLET | Refills: 0 | Status: SHIPPED | OUTPATIENT
Start: 2021-07-17 | End: 2021-08-16

## 2021-07-17 NOTE — PATIENT INSTRUCTIONS
Start the flexeril for muscle pain and stiffness. Monitor effectiveness and for side effects/allergy. Do not take it with alcohol. Do not take it with the xanax. Do not drive while taking it. Continue ibuprofen.  Take it with food and do not lay down aft

## 2021-07-17 NOTE — PROGRESS NOTES
Mary Abreu is a 62year old female. CHIEF COMPLAINT   Left shoulder pain, insomnia    HPI:   Seeing ortho. Left shoulder pain 8/10. Worse with sleep due to position. Doing PT. ROM is better but still limited.   Wondering if a muscle relaxer would h Comment: 1 drink per week    Drug use: No       REVIEW OF SYSTEMS:   See HPI    EXAM:     /68 (BP Location: Right arm, Patient Position: Sitting, Cuff Size: adult)   Pulse 78   Temp 98.1 °F (36.7 °C) (Temporal)   Resp 14   Ht 5' (1.524 m)   Wt 131 Component Value Date    A1C 5.6 10/26/2020        ASSESSMENT AND PLAN:   1. Acute pain of left shoulder  - Left shoulder pain 8/10. Interrupting sleep. - Continue PT  -Continue to see ortho  - start flexeril as needed.  See pt instructions for EDU  - cyc

## 2021-10-04 ENCOUNTER — OFFICE VISIT (OUTPATIENT)
Dept: INTERNAL MEDICINE CLINIC | Facility: CLINIC | Age: 61
End: 2021-10-04
Payer: COMMERCIAL

## 2021-10-04 VITALS
HEART RATE: 65 BPM | SYSTOLIC BLOOD PRESSURE: 90 MMHG | HEIGHT: 59.25 IN | RESPIRATION RATE: 14 BRPM | BODY MASS INDEX: 27.18 KG/M2 | WEIGHT: 134.81 LBS | TEMPERATURE: 99 F | OXYGEN SATURATION: 98 % | DIASTOLIC BLOOD PRESSURE: 56 MMHG

## 2021-10-04 DIAGNOSIS — Z00.00 ENCOUNTER FOR ANNUAL PHYSICAL EXAM: Primary | ICD-10-CM

## 2021-10-04 DIAGNOSIS — Z13.29 SCREENING FOR THYROID DISORDER: ICD-10-CM

## 2021-10-04 DIAGNOSIS — Z13.220 SCREENING FOR CHOLESTEROL LEVEL: ICD-10-CM

## 2021-10-04 DIAGNOSIS — E78.00 HYPERCHOLESTEROLEMIA: ICD-10-CM

## 2021-10-04 DIAGNOSIS — Z12.31 SCREENING MAMMOGRAM FOR BREAST CANCER: ICD-10-CM

## 2021-10-04 DIAGNOSIS — Z12.11 COLON CANCER SCREENING: ICD-10-CM

## 2021-10-04 DIAGNOSIS — Z86.018 HISTORY OF UTERINE FIBROID: ICD-10-CM

## 2021-10-04 DIAGNOSIS — Z23 NEED FOR VACCINATION: ICD-10-CM

## 2021-10-04 DIAGNOSIS — F43.21 GRIEVING: ICD-10-CM

## 2021-10-04 DIAGNOSIS — E03.9 ACQUIRED HYPOTHYROIDISM: ICD-10-CM

## 2021-10-04 DIAGNOSIS — F41.9 ANXIETY: ICD-10-CM

## 2021-10-04 PROBLEM — S83.281A ACUTE LATERAL MENISCUS TEAR OF RIGHT KNEE: Status: ACTIVE | Noted: 2021-03-23

## 2021-10-04 PROBLEM — M22.41 CHONDROMALACIA OF RIGHT PATELLA: Status: ACTIVE | Noted: 2021-03-23

## 2021-10-04 PROCEDURE — 90686 IIV4 VACC NO PRSV 0.5 ML IM: CPT | Performed by: INTERNAL MEDICINE

## 2021-10-04 PROCEDURE — 99396 PREV VISIT EST AGE 40-64: CPT | Performed by: NURSE PRACTITIONER

## 2021-10-04 PROCEDURE — 3008F BODY MASS INDEX DOCD: CPT | Performed by: INTERNAL MEDICINE

## 2021-10-04 PROCEDURE — 90471 IMMUNIZATION ADMIN: CPT | Performed by: INTERNAL MEDICINE

## 2021-10-04 PROCEDURE — 3078F DIAST BP <80 MM HG: CPT | Performed by: INTERNAL MEDICINE

## 2021-10-04 PROCEDURE — 3074F SYST BP LT 130 MM HG: CPT | Performed by: INTERNAL MEDICINE

## 2021-10-04 NOTE — PROGRESS NOTES
CHIEF COMPLAINT   Complete physical    HPI:   Rg Bearden is a 62year old female who presents for a complete physical exam.     Wt Readings from Last 6 Encounters:  10/04/21 : 134 lb 12.8 oz (61.1 kg)  07/17/21 : 131 lb (59.4 kg)  09/23/20 : 126 lb mouth daily. • ADVIL 200 MG OR TABS Take 600 mg by mouth every 8 (eight) hours as needed.             No Known Allergies        Seasonal                OTHER (SEE COMMENTS)    Comment:PND   Past Medical History:   Diagnosis Date   • Contracture of upper complaint of nasal congestion, sinus pain or ST  LUNGS: no complaint of shortness of breath with exertion  CARDIOVASCULAR: no complaint of chest pain on exertion  GI: no complaint of pain,denies heartburn  : no complaints of vaginal discharge or urinary 10/26/2020     10/26/2020    K 4.4 10/26/2020     10/26/2020    CO2 29 10/26/2020      Lab Results   Component Value Date    CHOLEST 226 (H) 10/26/2020    TRIG 63 10/26/2020    HDL 69 10/26/2020     (H) 10/26/2020    VLDL 33 10/30/2018

## 2021-10-04 NOTE — PATIENT INSTRUCTIONS
Get your labs done. You should be fasting for at least 10 hours. If you take a multivitamin with Biotin or any biotin product it should be held for 3 days prior to getting your labs done.     See gyne    Get your mammogram done    Check with your insurance

## 2021-10-08 DIAGNOSIS — E78.00 HYPERCHOLESTEROLEMIA: ICD-10-CM

## 2021-10-08 RX ORDER — PRAVASTATIN SODIUM 40 MG
TABLET ORAL
Qty: 90 TABLET | Refills: 0 | Status: SHIPPED | OUTPATIENT
Start: 2021-10-08

## 2021-10-12 ENCOUNTER — MED REC SCAN ONLY (OUTPATIENT)
Dept: INTERNAL MEDICINE CLINIC | Facility: CLINIC | Age: 61
End: 2021-10-12

## 2021-10-12 ENCOUNTER — TELEPHONE (OUTPATIENT)
Dept: INTERNAL MEDICINE CLINIC | Facility: CLINIC | Age: 61
End: 2021-10-12

## 2021-10-12 NOTE — TELEPHONE ENCOUNTER
Deniz Vazquez,     I left several messages with my contact information and have not heard back from the patient. In my last message I also provided the [facility] number just in case [phone]. I am closing the order at this time.  Please f

## 2021-10-21 NOTE — PROGRESS NOTES
Spoke to pt. Made aware of results & recommendations. Pt voiced understanding.   Pt stated she takes her statin regularly but has been inactive last few months due to knee surgery  Advised to follow more stricter diet and exercise  Pt v/u advised to repeat

## 2021-11-08 ENCOUNTER — TELEPHONE (OUTPATIENT)
Dept: INTERNAL MEDICINE CLINIC | Facility: CLINIC | Age: 61
End: 2021-11-08

## 2021-11-08 NOTE — TELEPHONE ENCOUNTER
Received call from pt asking about MCHC level on her CBC from 10/20/21. Pt very anxious & concerned about MCHC level at 32 (normal range 32-36). States mother  with aplastic anemia. Pt denies any SOB, weakness, fatigue, dizziness, palpitations.   Exp

## 2021-12-03 ENCOUNTER — LAB ENCOUNTER (OUTPATIENT)
Dept: LAB | Facility: HOSPITAL | Age: 61
End: 2021-12-03
Attending: INTERNAL MEDICINE
Payer: COMMERCIAL

## 2021-12-03 DIAGNOSIS — Z01.818 PREOP TESTING: ICD-10-CM

## 2021-12-06 PROBLEM — Z12.11 SPECIAL SCREENING FOR MALIGNANT NEOPLASM OF COLON: Status: ACTIVE | Noted: 2021-12-06

## 2021-12-06 PROBLEM — D12.3 BENIGN NEOPLASM OF TRANSVERSE COLON: Status: ACTIVE | Noted: 2021-12-06

## 2021-12-09 ENCOUNTER — TELEPHONE (OUTPATIENT)
Dept: INTERNAL MEDICINE CLINIC | Facility: CLINIC | Age: 61
End: 2021-12-09

## 2021-12-09 NOTE — TELEPHONE ENCOUNTER
Incoming (mail or fax):  mail  Received from:  [facility]  Documentation given to:  Triage - Grover rodriguez    Colonoscopy test results

## 2021-12-10 NOTE — TELEPHONE ENCOUNTER
Noted HM updated.   Pathology report pending  To [facility]

## 2021-12-11 ENCOUNTER — HOSPITAL ENCOUNTER (OUTPATIENT)
Dept: MAMMOGRAPHY | Facility: HOSPITAL | Age: 61
Discharge: HOME | End: 2021-12-11
Attending: NURSE PRACTITIONER
Payer: COMMERCIAL

## 2021-12-11 DIAGNOSIS — Z12.31 SCREENING MAMMOGRAM FOR BREAST CANCER: ICD-10-CM

## 2021-12-11 PROCEDURE — 77067 SCR MAMMO BI INCL CAD: CPT | Performed by: NURSE PRACTITIONER

## 2021-12-11 PROCEDURE — 77063 BREAST TOMOSYNTHESIS BI: CPT | Performed by: NURSE PRACTITIONER

## 2021-12-14 ENCOUNTER — MED REC SCAN ONLY (OUTPATIENT)
Dept: INTERNAL MEDICINE CLINIC | Facility: CLINIC | Age: 61
End: 2021-12-14

## 2022-01-10 ENCOUNTER — OFFICE VISIT (OUTPATIENT)
Dept: OBGYN CLINIC | Facility: CLINIC | Age: 62
End: 2022-01-10
Payer: COMMERCIAL

## 2022-01-10 VITALS
HEIGHT: 59.25 IN | SYSTOLIC BLOOD PRESSURE: 98 MMHG | HEART RATE: 77 BPM | DIASTOLIC BLOOD PRESSURE: 60 MMHG | BODY MASS INDEX: 24.15 KG/M2 | WEIGHT: 119.81 LBS

## 2022-01-10 DIAGNOSIS — N89.8 VAGINAL IRRITATION: ICD-10-CM

## 2022-01-10 DIAGNOSIS — Z86.018 HISTORY OF UTERINE FIBROID: ICD-10-CM

## 2022-01-10 DIAGNOSIS — Z12.4 SCREENING FOR CERVICAL CANCER: ICD-10-CM

## 2022-01-10 DIAGNOSIS — R10.2 PELVIC CRAMPING: ICD-10-CM

## 2022-01-10 DIAGNOSIS — Z01.411 ENCOUNTER FOR WELL WOMAN EXAM WITH ABNORMAL FINDINGS: Primary | ICD-10-CM

## 2022-01-10 DIAGNOSIS — N90.89 VULVAR LESION: ICD-10-CM

## 2022-01-10 PROBLEM — D12.6 TUBULAR ADENOMA OF COLON: Status: ACTIVE | Noted: 2021-12-06

## 2022-01-10 PROCEDURE — 3074F SYST BP LT 130 MM HG: CPT | Performed by: OBSTETRICS & GYNECOLOGY

## 2022-01-10 PROCEDURE — 3078F DIAST BP <80 MM HG: CPT | Performed by: OBSTETRICS & GYNECOLOGY

## 2022-01-10 PROCEDURE — 87624 HPV HI-RISK TYP POOLED RSLT: CPT | Performed by: OBSTETRICS & GYNECOLOGY

## 2022-01-10 PROCEDURE — 3008F BODY MASS INDEX DOCD: CPT | Performed by: OBSTETRICS & GYNECOLOGY

## 2022-01-10 PROCEDURE — 99386 PREV VISIT NEW AGE 40-64: CPT | Performed by: OBSTETRICS & GYNECOLOGY

## 2022-01-10 PROCEDURE — 99203 OFFICE O/P NEW LOW 30 MIN: CPT | Performed by: OBSTETRICS & GYNECOLOGY

## 2022-01-10 PROCEDURE — 88175 CYTOPATH C/V AUTO FLUID REDO: CPT | Performed by: OBSTETRICS & GYNECOLOGY

## 2022-01-10 RX ORDER — FLUCONAZOLE 150 MG/1
150 TABLET ORAL
Qty: 3 TABLET | Refills: 0 | Status: SHIPPED | OUTPATIENT
Start: 2022-01-10 | End: 2022-01-17

## 2022-01-11 NOTE — PATIENT INSTRUCTIONS
Vulvar care basics:    Cleaning:  -use plain warm (not hot) water (no soap) to wash if needed or can take Sitz bath (see below)   -use water, fingers, & only very gentle, fragrance-free, moisturizing soap       (e.g. Cetaphil or CeraVe hydrating or gentle clotrimazole or Nystatin (prescription) will be needed   -if diagnosed with chronic inflammatory skin condition (e.g. lichen sclerosis)          Most important is avoiding scratching & irritants         Work with gynecologist to form a steroid regimen for

## 2022-01-11 NOTE — H&P
[facility]  Obstetrics and Gynecology   History & Physical  NEW    Chief complaint: Patient presents with:  Wellness Visit: New pt, history of fibroids  Gyn Problem: Pt c/o a pimple in the vaginal area and vaginal dryness.       Subjective:     HP History     T0    L0    SAB0  IAB0  Ectopic0  Multiple0  Live Births0   OB History    Para Term  AB Living   0 0 0 0 0 0   SAB IAB Ectopic Multiple Live Births   0 0 0 0 0       Meds:  levothyroxine 100 MCG Oral Tab, Take 1 tablet Tubular adenoma of colon 12/06/2021    Tubular adenoma of transverse colon    • Vertigo        PSH:  Past Surgical History:   Procedure Laterality Date   • COLONOSCOPY & POLYPECTOMY  12/06/2021    Tubular adenoma of transverse colon removed.  Recall 7 years file  Housing Stability: Not on file     Family History:  Family History   Problem Relation Age of Onset   • Heart Attack Father 76   • Other (Other) Mother 80        aplastic anemia   • Diabetes Maternal Grandmother 76   • High Cholesterol Brother    • Cassi Gordon masses  PELVIC FLOOR: mild hypertonicity, +slight tenderness         Neurological: She is alert. No cranial nerve deficit. Skin: Skin is warm and dry. Psychiatric: She has a normal mood and affect.  Her behavior is normal. Judgment and thought content n with a target due date for the next mammogram.     Dictated by (CST): Sandra Vargas MD on 12/13/2021 at 2:01 PM     Finalized by (CST): Sandra Vargas MD on 12/13/2021 at 2:02 PM          Assessment:     Jong Martines is a 62year old [facility] female

## 2022-01-12 ENCOUNTER — TELEPHONE (OUTPATIENT)
Dept: OBGYN CLINIC | Facility: CLINIC | Age: 62
End: 2022-01-12

## 2022-01-12 DIAGNOSIS — M85.89 OSTEOPENIA OF MULTIPLE SITES: Primary | ICD-10-CM

## 2022-01-12 LAB — HPV I/H RISK 1 DNA SPEC QL NAA+PROBE: NEGATIVE

## 2022-01-13 NOTE — TELEPHONE ENCOUNTER
Pt calling to speak to [facility] about medical records request she filled out Monday night. Pt states she needs to request ALL records with wider date range. Pt states she did not request far enough back.   Record Release Form was sent to med rec sukh mortensen

## 2022-01-13 NOTE — TELEPHONE ENCOUNTER
Outside records received from Dr. Lety Powers    Dx osteopenia reportedly in 2011  DEXA report osteopenia in 2016    Has patient had any treatment for the osteopenia? Does this sound like the last DEXA she has had? Appears overdue to repeat DEXA.    DEXA order p

## 2022-01-14 ENCOUNTER — TELEPHONE (OUTPATIENT)
Dept: OBGYN CLINIC | Facility: CLINIC | Age: 62
End: 2022-01-14

## 2022-01-14 DIAGNOSIS — N89.8 VAGINAL IRRITATION: ICD-10-CM

## 2022-01-14 DIAGNOSIS — R10.2 PELVIC CRAMPING: ICD-10-CM

## 2022-01-14 DIAGNOSIS — Z86.018 HISTORY OF UTERINE FIBROID: Primary | ICD-10-CM

## 2022-01-14 DIAGNOSIS — N90.89 VULVAR LESION: ICD-10-CM

## 2022-01-14 NOTE — TELEPHONE ENCOUNTER
Just an FYI per pt - she canceled appt for ultrasound with Nancy Mcbride and will be getting the ultrasound done at [facility].  She is able to schedule that sooner and for less $$

## 2022-01-14 NOTE — TELEPHONE ENCOUNTER
Spoke with patient. Instructed her that having US done at [facility] is perfectly fine. New orders routed. Instructed her to call if we do not get back to her with recommendations after 1 week from [address]. Understanding verbalized.

## 2022-01-15 DIAGNOSIS — E03.9 ACQUIRED HYPOTHYROIDISM: ICD-10-CM

## 2022-01-15 RX ORDER — LEVOTHYROXINE SODIUM 0.1 MG/1
TABLET ORAL
Qty: 30 TABLET | Refills: 0 | Status: SHIPPED | OUTPATIENT
Start: 2022-01-15

## 2022-01-15 NOTE — TELEPHONE ENCOUNTER
Last OV relevant to medication: 10/4/21  Last refill date: 10/22/21     #/refills: 90/0  When pt was asked to return for OV: 1 year  Upcoming appt/reason: none  Was pt informed of any over due labs: yes, MyCpetert sent    Patient given one time 30 day supply

## 2022-01-17 ENCOUNTER — TELEPHONE (OUTPATIENT)
Dept: OBGYN CLINIC | Facility: CLINIC | Age: 62
End: 2022-01-17

## 2022-01-18 ENCOUNTER — TELEPHONE (OUTPATIENT)
Dept: OBGYN CLINIC | Facility: CLINIC | Age: 62
End: 2022-01-18

## 2022-01-19 NOTE — TELEPHONE ENCOUNTER
Discussed result note and recommendation. Pt takes calcium and vit D for osteopenia, no formal tx. Pt will make an appt for DEXA. Pt. Rocael Gonzalez. Understanding of POC.

## 2022-01-27 ENCOUNTER — OFFICE VISIT (OUTPATIENT)
Dept: INTERNAL MEDICINE CLINIC | Facility: CLINIC | Age: 62
End: 2022-01-27
Payer: COMMERCIAL

## 2022-01-27 VITALS
WEIGHT: 122.63 LBS | BODY MASS INDEX: 24.72 KG/M2 | SYSTOLIC BLOOD PRESSURE: 98 MMHG | HEIGHT: 59.25 IN | TEMPERATURE: 99 F | RESPIRATION RATE: 14 BRPM | DIASTOLIC BLOOD PRESSURE: 58 MMHG | OXYGEN SATURATION: 98 % | HEART RATE: 72 BPM

## 2022-01-27 DIAGNOSIS — R14.0 ABDOMINAL BLOATING: Primary | ICD-10-CM

## 2022-01-27 DIAGNOSIS — F51.04 PSYCHOPHYSIOLOGICAL INSOMNIA: ICD-10-CM

## 2022-01-27 DIAGNOSIS — R10.817 GENERALIZED ABDOMINAL TENDERNESS, REBOUND TENDERNESS PRESENCE NOT SPECIFIED: ICD-10-CM

## 2022-01-27 PROCEDURE — 3008F BODY MASS INDEX DOCD: CPT | Performed by: NURSE PRACTITIONER

## 2022-01-27 PROCEDURE — 99214 OFFICE O/P EST MOD 30 MIN: CPT | Performed by: NURSE PRACTITIONER

## 2022-01-27 PROCEDURE — 3078F DIAST BP <80 MM HG: CPT | Performed by: NURSE PRACTITIONER

## 2022-01-27 PROCEDURE — 3074F SYST BP LT 130 MM HG: CPT | Performed by: NURSE PRACTITIONER

## 2022-01-27 RX ORDER — ALPRAZOLAM 0.5 MG/1
TABLET ORAL
Qty: 30 TABLET | Refills: 1 | Status: SHIPPED | OUTPATIENT
Start: 2022-01-27

## 2022-01-27 NOTE — PROGRESS NOTES
Mike Caro is a 62year old female. CHIEF COMPLAINT   GI issues    HPI:   Noticed some symptoms since November- white pieces in stool for a few days at that time. Then again noticed the same thing in December.  The white pieces looked like hard lupillo ultrasound 05/27/2016    Fibroid (14.06x15.79)   • H/O pelvic ultrasound 05/27/2016    Fibroids 12.06x15.79, uterus length 3.08 uterus height 2.29 per Michael Heard MD records   • H/O pelvic ultrasound 11/30/2012    Uterus Length 28mm, Uterus width 23mm, per [facility] BS[facility],no masses, organomegaly or tenderness. Has discomfort to bilateral lower quadrant that is not painful but uncomfortable. MUSCULOSKELETAL: No obvious joint deformity or swelling. Normal gait.   EXTREMITIES: no cyanosis, clubbing or edema  NEURO: O Translabial imaging demonstrates a nonvascular anechoic 10 x 6 x 6 mm cyst with increased through transmission in the region of the patient's \"lump\". 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  The right horn measures approximately 4.2 x 2.0 x 2.6 c negative and no improvement may see GI as already scheduled. - XR ABDOMEN (1 VIEW) (CPT=74018); Future        The patient indicates understanding of these issues and agrees to the plan.   The patient is asked to return as needed or when routine care is due

## 2022-01-27 NOTE — TELEPHONE ENCOUNTER
Last OV relevant to medication: 10/04/2021  Last refill date: 7/17/2021   #/refills: 90-0  When pt was asked to return for OV: follow up in 1 year or prn  Upcoming appt/reason: 1/27/2022 for bowel issues  Was pt informed of any over due labs: yes sp villaseñor

## 2022-01-27 NOTE — PATIENT INSTRUCTIONS
Get your abdominal xray completed     Use squatty potty or similar position to help facilitate emptying completely    Stay active    continue hydration and fiber    Do a food diary to try to track when your symptoms occur with relation to food    See gastr

## 2022-01-28 ENCOUNTER — HOSPITAL ENCOUNTER (OUTPATIENT)
Dept: GENERAL RADIOLOGY | Age: 62
Discharge: HOME | End: 2022-01-28
Attending: NURSE PRACTITIONER
Payer: COMMERCIAL

## 2022-01-28 DIAGNOSIS — R10.817 GENERALIZED ABDOMINAL TENDERNESS, REBOUND TENDERNESS PRESENCE NOT SPECIFIED: ICD-10-CM

## 2022-01-28 DIAGNOSIS — R14.0 ABDOMINAL BLOATING: ICD-10-CM

## 2022-01-28 PROCEDURE — 74018 RADEX ABDOMEN 1 VIEW: CPT | Performed by: NURSE PRACTITIONER

## 2022-01-31 ENCOUNTER — PATIENT MESSAGE (OUTPATIENT)
Dept: INTERNAL MEDICINE CLINIC | Facility: CLINIC | Age: 62
End: 2022-01-31

## 2022-02-11 RX ORDER — LEVOTHYROXINE SODIUM 88 UG/1
88 TABLET ORAL
Qty: 90 TABLET | Refills: 0 | Status: SHIPPED | OUTPATIENT
Start: 2022-02-11

## 2022-05-16 RX ORDER — LEVOTHYROXINE SODIUM 88 UG/1
TABLET ORAL
Qty: 90 TABLET | Refills: 0 | OUTPATIENT
Start: 2022-05-16

## 2022-05-16 NOTE — TELEPHONE ENCOUNTER
Called pt and to remind her to complete her labs, she stated she will be completing her labs this week and has enough medication for roughly 2 weeks. Informed pt RX has been denied and we will reach out once the labs have been resulted.

## 2022-05-17 ENCOUNTER — TELEPHONE (OUTPATIENT)
Dept: OBGYN CLINIC | Facility: CLINIC | Age: 62
End: 2022-05-17

## 2022-05-17 DIAGNOSIS — E05.90 HYPERTHYROIDISM: Primary | ICD-10-CM

## 2022-05-17 LAB — TSH: 0.39 MIU/L (ref 0.4–4.5)

## 2022-05-17 NOTE — TELEPHONE ENCOUNTER
Pt has a skin tag on her private are, asking which doctor to see. Also is concerned about some notations in her chart that she believes are not right such as Vertigo.

## 2022-05-17 NOTE — TELEPHONE ENCOUNTER
C/o skin tag on left labia minora. Irritating and painful. No bleeding noted. Pt will go over her chart during her visit to review some dx that she does not have like vertigo. Appt 5/23 with EP. Pt verb understanding.

## 2022-05-23 ENCOUNTER — OFFICE VISIT (OUTPATIENT)
Dept: OBGYN CLINIC | Facility: CLINIC | Age: 62
End: 2022-05-23
Payer: COMMERCIAL

## 2022-05-23 VITALS
BODY MASS INDEX: 24.85 KG/M2 | DIASTOLIC BLOOD PRESSURE: 68 MMHG | WEIGHT: 131.63 LBS | HEART RATE: 78 BPM | SYSTOLIC BLOOD PRESSURE: 92 MMHG | HEIGHT: 61 IN

## 2022-05-23 DIAGNOSIS — N94.819 VULVODYNIA: ICD-10-CM

## 2022-05-23 DIAGNOSIS — A63.0 GENITAL WARTS: Primary | ICD-10-CM

## 2022-05-23 PROCEDURE — 11420 EXC H-F-NK-SP B9+MARG 0.5/<: CPT | Performed by: OBSTETRICS & GYNECOLOGY

## 2022-05-23 PROCEDURE — 3078F DIAST BP <80 MM HG: CPT | Performed by: OBSTETRICS & GYNECOLOGY

## 2022-05-23 PROCEDURE — 3008F BODY MASS INDEX DOCD: CPT | Performed by: OBSTETRICS & GYNECOLOGY

## 2022-05-23 PROCEDURE — 88305 TISSUE EXAM BY PATHOLOGIST: CPT | Performed by: OBSTETRICS & GYNECOLOGY

## 2022-05-23 PROCEDURE — 99213 OFFICE O/P EST LOW 20 MIN: CPT | Performed by: OBSTETRICS & GYNECOLOGY

## 2022-05-23 PROCEDURE — 3074F SYST BP LT 130 MM HG: CPT | Performed by: OBSTETRICS & GYNECOLOGY

## 2022-05-23 RX ORDER — CONJUGATED ESTROGENS 0.62 MG/G
0.5 CREAM VAGINAL
Qty: 42.5 G | Refills: 2 | Status: SHIPPED | OUTPATIENT
Start: 2022-05-23 | End: 2022-06-01

## 2022-05-26 ENCOUNTER — TELEPHONE (OUTPATIENT)
Dept: OBGYN CLINIC | Facility: CLINIC | Age: 62
End: 2022-05-26

## 2022-05-26 DIAGNOSIS — E78.00 HYPERCHOLESTEROLEMIA: ICD-10-CM

## 2022-05-26 DIAGNOSIS — E05.90 HYPERTHYROIDISM: ICD-10-CM

## 2022-05-26 RX ORDER — PRAVASTATIN SODIUM 40 MG
TABLET ORAL
Qty: 90 TABLET | Refills: 1 | Status: SHIPPED | OUTPATIENT
Start: 2022-05-26

## 2022-05-28 NOTE — TELEPHONE ENCOUNTER
Pt is ok to pay the copay if the MD thinks Premarin is the better option for her.  She's just checking

## 2022-05-31 RX ORDER — LEVOTHYROXINE SODIUM 88 UG/1
TABLET ORAL
Qty: 90 TABLET | Refills: 0 | Status: SHIPPED | OUTPATIENT
Start: 2022-05-31

## 2022-05-31 NOTE — TELEPHONE ENCOUNTER
Thyroid Supplements Protocol Passed 05/26/2022 05:35 PM   Protocol Details  TSH test in past 12 months    TSH value between 0.350 and 5.500 IU/ml    Appointment in past 12 or next 3 months

## 2022-10-05 DIAGNOSIS — E05.90 HYPERTHYROIDISM: ICD-10-CM

## 2022-10-05 RX ORDER — LEVOTHYROXINE SODIUM 88 UG/1
TABLET ORAL
Qty: 30 TABLET | Refills: 0 | Status: SHIPPED | OUTPATIENT
Start: 2022-10-05

## 2022-10-05 NOTE — TELEPHONE ENCOUNTER
Last OV relevant to medication: 10/04/2021  PE   Last refill date: 05/31/2022    #/refills: 90-0  When pt was asked to return for OV: Follow up in one year or sooner as needed  Upcoming appt/reason: 10/10/2022 PE  Was pt informed of any over due labs: yes TSH, mychart sent   T4, FREE (ng/dL)   Date Value   10/26/2020 1.6     TSH (mIU/L)   Date Value   05/16/2022 0.39 (L)     TSH W/REFLEX TO FT4 (mIU/L)   Date Value   10/26/2020 6.46 (H)

## 2022-10-10 ENCOUNTER — OFFICE VISIT (OUTPATIENT)
Dept: INTERNAL MEDICINE CLINIC | Facility: CLINIC | Age: 62
End: 2022-10-10
Payer: COMMERCIAL

## 2022-10-10 VITALS
DIASTOLIC BLOOD PRESSURE: 60 MMHG | BODY MASS INDEX: 25.63 KG/M2 | TEMPERATURE: 98 F | RESPIRATION RATE: 20 BRPM | WEIGHT: 134 LBS | HEART RATE: 78 BPM | OXYGEN SATURATION: 98 % | HEIGHT: 60.5 IN | SYSTOLIC BLOOD PRESSURE: 96 MMHG

## 2022-10-10 DIAGNOSIS — Z12.31 ENCOUNTER FOR SCREENING MAMMOGRAM FOR MALIGNANT NEOPLASM OF BREAST: ICD-10-CM

## 2022-10-10 DIAGNOSIS — Z13.220 SCREENING FOR CHOLESTEROL LEVEL: ICD-10-CM

## 2022-10-10 DIAGNOSIS — E78.00 HYPERCHOLESTEROLEMIA: ICD-10-CM

## 2022-10-10 DIAGNOSIS — F41.9 ANXIETY: ICD-10-CM

## 2022-10-10 DIAGNOSIS — Z23 NEED FOR VACCINATION: ICD-10-CM

## 2022-10-10 DIAGNOSIS — E03.9 ACQUIRED HYPOTHYROIDISM: ICD-10-CM

## 2022-10-10 DIAGNOSIS — Z00.00 ENCOUNTER FOR ANNUAL PHYSICAL EXAM: Primary | ICD-10-CM

## 2022-10-10 PROCEDURE — 99396 PREV VISIT EST AGE 40-64: CPT | Performed by: NURSE PRACTITIONER

## 2022-10-10 PROCEDURE — 99214 OFFICE O/P EST MOD 30 MIN: CPT | Performed by: NURSE PRACTITIONER

## 2022-10-10 NOTE — PATIENT INSTRUCTIONS
Get your labs done. You should be fasting for at least 10 hours. If you take a multivitamin with Biotin or any biotin product it should be held for 3 days prior to getting your labs done. Continue your current medication    COVID booster recommended     Check with your insurance to verify coverage for the Shingrix vaccine for shingles. Also check to see where you can go to get the vaccine.      Get your mammogram completed in December when due    Continue to see gyne    Follow up in 1 year for physical and med check or sooner as needed

## 2022-10-22 LAB
ABSOLUTE BASOPHILS: 32 CELLS/UL (ref 0–200)
ABSOLUTE EOSINOPHILS: 192 CELLS/UL (ref 15–500)
ABSOLUTE LYMPHOCYTES: 1779 CELLS/UL (ref 850–3900)
ABSOLUTE MONOCYTES: 512 CELLS/UL (ref 200–950)
ABSOLUTE NEUTROPHILS: 3885 CELLS/UL (ref 1500–7800)
ALBUMIN/GLOBULIN RATIO: 1.8 (CALC) (ref 1–2.5)
ALBUMIN: 4.6 G/DL (ref 3.6–5.1)
ALKALINE PHOSPHATASE: 64 U/L (ref 37–153)
ALT: 16 U/L (ref 6–29)
AST: 21 U/L (ref 10–35)
BASOPHILS: 0.5 %
BILIRUBIN, TOTAL: 0.6 MG/DL (ref 0.2–1.2)
BUN: 12 MG/DL (ref 7–25)
CALCIUM: 10.2 MG/DL (ref 8.6–10.4)
CARBON DIOXIDE: 29 MMOL/L (ref 20–32)
CHLORIDE: 103 MMOL/L (ref 98–110)
CHOL/HDLC RATIO: 3 (CALC)
CHOLESTEROL, TOTAL: 198 MG/DL
CREATININE: 0.72 MG/DL (ref 0.5–1.05)
EGFR: 96 ML/MIN/1.73M2
EOSINOPHILS: 3 %
GLOBULIN: 2.6 G/DL (CALC) (ref 1.9–3.7)
GLUCOSE: 92 MG/DL (ref 65–99)
HDL CHOLESTEROL: 67 MG/DL
HEMATOCRIT: 41.4 % (ref 35–45)
HEMOGLOBIN: 13.6 G/DL (ref 11.7–15.5)
LDL-CHOLESTEROL: 113 MG/DL (CALC)
LYMPHOCYTES: 27.8 %
MCH: 29.2 PG (ref 27–33)
MCHC: 32.9 G/DL (ref 32–36)
MCV: 89 FL (ref 80–100)
MONOCYTES: 8 %
MPV: 10 FL (ref 7.5–12.5)
NEUTROPHILS: 60.7 %
NON-HDL CHOLESTEROL: 131 MG/DL (CALC)
PLATELET COUNT: 277 THOUSAND/UL (ref 140–400)
POTASSIUM: 4.2 MMOL/L (ref 3.5–5.3)
PROTEIN, TOTAL: 7.2 G/DL (ref 6.1–8.1)
RDW: 12.6 % (ref 11–15)
RED BLOOD CELL COUNT: 4.65 MILLION/UL (ref 3.8–5.1)
SODIUM: 140 MMOL/L (ref 135–146)
TRIGLYCERIDES: 82 MG/DL
WHITE BLOOD CELL COUNT: 6.4 THOUSAND/UL (ref 3.8–10.8)

## 2022-11-09 ENCOUNTER — TELEPHONE (OUTPATIENT)
Dept: INTERNAL MEDICINE CLINIC | Facility: CLINIC | Age: 62
End: 2022-11-09

## 2022-11-09 DIAGNOSIS — E05.90 HYPERTHYROIDISM: ICD-10-CM

## 2022-11-09 RX ORDER — LEVOTHYROXINE SODIUM 88 UG/1
88 TABLET ORAL
Qty: 90 TABLET | Refills: 0 | Status: SHIPPED | OUTPATIENT
Start: 2022-11-09

## 2022-11-09 NOTE — TELEPHONE ENCOUNTER
Is this medication prescribed by the [facility] Providers? yes    Did the patient contact the pharmacy directly?:  No - patient is requesting 90 day supply rather than 30    Is patient out of meds or supply very low?:  Low (5 days)    Medication Requested:  LEVOTHYROXINE Oral Tab    Dose:  88 MCG    Is patient requesting a 30 or 90 day supply?:  90    Pharmacy name and phone # or location:  [address] [place] [address]. [phone], [phone]    Is the patient due for an appointment?: no  (if so, please schedule appt)    Additional Notes:  none    Please advise the patient refills take up to 72 business hours.

## 2022-12-30 ENCOUNTER — HOSPITAL ENCOUNTER (OUTPATIENT)
Dept: MAMMOGRAPHY | Facility: HOSPITAL | Age: 62
Discharge: HOME | End: 2022-12-30
Attending: NURSE PRACTITIONER
Payer: COMMERCIAL

## 2022-12-30 DIAGNOSIS — Z12.31 ENCOUNTER FOR SCREENING MAMMOGRAM FOR MALIGNANT NEOPLASM OF BREAST: ICD-10-CM

## 2022-12-30 PROCEDURE — 77063 BREAST TOMOSYNTHESIS BI: CPT | Performed by: NURSE PRACTITIONER

## 2022-12-30 PROCEDURE — 77067 SCR MAMMO BI INCL CAD: CPT | Performed by: NURSE PRACTITIONER

## 2023-01-01 DIAGNOSIS — E78.00 HYPERCHOLESTEROLEMIA: ICD-10-CM

## 2023-01-03 RX ORDER — PRAVASTATIN SODIUM 40 MG
TABLET ORAL
Qty: 90 TABLET | Refills: 1 | Status: SHIPPED | OUTPATIENT
Start: 2023-01-03

## 2023-01-31 DIAGNOSIS — E05.90 HYPERTHYROIDISM: ICD-10-CM

## 2023-02-01 NOTE — TELEPHONE ENCOUNTER
Contacted pt because notes indicate she was due for a TSH level in August, pt states she is at work will call back tomorrow.

## 2023-02-02 RX ORDER — LEVOTHYROXINE SODIUM 88 UG/1
TABLET ORAL
Qty: 30 TABLET | Refills: 0 | Status: SHIPPED | OUTPATIENT
Start: 2023-02-02

## 2023-03-01 DIAGNOSIS — E05.90 HYPERTHYROIDISM: ICD-10-CM

## 2023-03-01 RX ORDER — LEVOTHYROXINE SODIUM 88 UG/1
TABLET ORAL
Qty: 30 TABLET | Refills: 3 | Status: SHIPPED | OUTPATIENT
Start: 2023-03-01

## 2023-03-03 ENCOUNTER — HOSPITAL ENCOUNTER (OUTPATIENT)
Age: 63
Discharge: HOME | End: 2023-03-03
Payer: COMMERCIAL

## 2023-03-03 VITALS
HEIGHT: 61 IN | DIASTOLIC BLOOD PRESSURE: 97 MMHG | TEMPERATURE: 99 F | OXYGEN SATURATION: 98 % | SYSTOLIC BLOOD PRESSURE: 136 MMHG | HEART RATE: 75 BPM | BODY MASS INDEX: 24.55 KG/M2 | RESPIRATION RATE: 20 BRPM | WEIGHT: 130 LBS

## 2023-03-03 DIAGNOSIS — J06.9 UPPER RESPIRATORY TRACT INFECTION, UNSPECIFIED TYPE: Primary | ICD-10-CM

## 2023-03-03 LAB — S PYO AG THROAT QL: NEGATIVE

## 2023-03-03 PROCEDURE — 99213 OFFICE O/P EST LOW 20 MIN: CPT | Performed by: NURSE PRACTITIONER

## 2023-03-03 PROCEDURE — 87880 STREP A ASSAY W/OPTIC: CPT | Performed by: NURSE PRACTITIONER

## 2023-03-03 RX ORDER — ALBUTEROL SULFATE 90 UG/1
2 AEROSOL, METERED RESPIRATORY (INHALATION) EVERY 4 HOURS PRN
Qty: 1 EACH | Refills: 0 | Status: SHIPPED | OUTPATIENT
Start: 2023-03-03 | End: 2023-04-02

## 2023-03-03 RX ORDER — METHYLPREDNISOLONE 4 MG/1
TABLET ORAL
Qty: 1 EACH | Refills: 0 | Status: SHIPPED | OUTPATIENT
Start: 2023-03-03

## 2023-03-03 RX ORDER — BENZONATATE 100 MG/1
100 CAPSULE ORAL 3 TIMES DAILY PRN
Qty: 30 CAPSULE | Refills: 0 | Status: SHIPPED | OUTPATIENT
Start: 2023-03-03 | End: 2023-04-02

## 2023-03-03 NOTE — ED INITIAL ASSESSMENT (HPI)
Pt c/o productive cough since Monday night, Pt r/t travel to [place], [place]. Pt also c/o sinus pressure.  Pt had 2x negative COVID tests at home

## 2023-03-03 NOTE — DISCHARGE INSTRUCTIONS
Your rapid strep test is negative today. Albuterol inhaler with spacer; 2 puffs every 4-6 hours as needed for coughing or wheezing. Prednisone with food. Do not take ibuprofen, motrin, advil, Aleve, Aspirin while taking prednisone. Benzonatate cough pills as needed and as directed for your cough. -Drink plenty of fluids. Use a humidifier.   -Get plenty of rest.   Acetaminophen or ibuprofen if needed for fever or body aches. Only take this medication if you are not allergic to it or have no other contraindications to taking this medicine. If you have a sore throat: You may gargle with salt water (1/2- 1 tsp of salt in 8 oz of warm water) or lozenges / throat sprays for throat discomfort. You should also suck on cough drops. Drink hot tea with honey to help control your cough. Topical Vicks VapoRub to your chest    Use a humidifier or inhale steam from a shower to increase air moisture. This may make it easier to breathe. Drink enough fluid to keep your urine clear or pale yellow. Rest as needed. Seek immediate medical care if your symptoms are persistent or worsening: spiking fevers, chest pain, shortness of breath, weakness, fatigue, worsening cough, feeling dehydrated.

## 2023-03-06 ENCOUNTER — PATIENT MESSAGE (OUTPATIENT)
Dept: INTERNAL MEDICINE CLINIC | Facility: CLINIC | Age: 63
End: 2023-03-06

## 2023-03-06 NOTE — TELEPHONE ENCOUNTER
Advised patient to continue prescribed medications and supportive care and follow up for appointment if not seeing improvement in 1-2 days.

## 2023-03-06 NOTE — TELEPHONE ENCOUNTER
From: Marleen Shah  To: TALISHA Marroquin  Sent: 3/6/2023 11:28 AM CST  Subject: Upper respiratory virus    Hi -   I got sick after returning home from [place] last Monday 2/27. I saw urgent care Friday & they gave me meds for symptoms. I'm slightly better but still lots of coughing and congestion. My voice is almost gone. How long should I wait for this to be better? Thanks!

## 2023-03-24 ENCOUNTER — TELEPHONE (OUTPATIENT)
Dept: INTERNAL MEDICINE CLINIC | Facility: CLINIC | Age: 63
End: 2023-03-24

## 2023-03-24 NOTE — TELEPHONE ENCOUNTER
Pt reports feeling better but still has lingering cough and still very tired. Pt would like to know if she should just let it run its course or should she come back in.

## 2023-03-27 ENCOUNTER — APPOINTMENT (OUTPATIENT)
Dept: GENERAL RADIOLOGY | Age: 63
End: 2023-03-27
Attending: NURSE PRACTITIONER
Payer: COMMERCIAL

## 2023-03-27 ENCOUNTER — HOSPITAL ENCOUNTER (OUTPATIENT)
Age: 63
Discharge: HOME | End: 2023-03-27
Payer: COMMERCIAL

## 2023-03-27 VITALS
HEART RATE: 82 BPM | DIASTOLIC BLOOD PRESSURE: 83 MMHG | OXYGEN SATURATION: 99 % | RESPIRATION RATE: 20 BRPM | TEMPERATURE: 98 F | SYSTOLIC BLOOD PRESSURE: 126 MMHG

## 2023-03-27 DIAGNOSIS — D64.9 ANEMIA, UNSPECIFIED TYPE: Primary | ICD-10-CM

## 2023-03-27 DIAGNOSIS — J40 SINOBRONCHITIS: ICD-10-CM

## 2023-03-27 DIAGNOSIS — E87.6 HYPOKALEMIA: ICD-10-CM

## 2023-03-27 DIAGNOSIS — J32.9 SINOBRONCHITIS: ICD-10-CM

## 2023-03-27 LAB
#MXD IC: 0.5 X10ˆ3/UL (ref 0.1–1)
BUN BLD-MCNC: 10 MG/DL (ref 7–18)
CHLORIDE BLD-SCNC: 100 MMOL/L (ref 98–112)
CO2 BLD-SCNC: 25 MMOL/L (ref 21–32)
CREAT BLD-MCNC: 0.6 MG/DL
GFR SERPLBLD BASED ON 1.73 SQ M-ARVRAT: 102 ML/MIN/1.73M2 (ref 60–?)
GLUCOSE BLD-MCNC: 101 MG/DL (ref 70–99)
HCT VFR BLD AUTO: 35.8 %
HCT VFR BLD CALC: 35 %
HGB BLD-MCNC: 11.8 G/DL
ISTAT IONIZED CALCIUM FOR CHEM 8: 1.1 MMOL/L (ref 1.12–1.32)
LYMPHOCYTES # BLD AUTO: 1.2 X10ˆ3/UL (ref 1–4)
LYMPHOCYTES NFR BLD AUTO: 31.2 %
MCH RBC QN AUTO: 28.9 PG (ref 26–34)
MCHC RBC AUTO-ENTMCNC: 33 G/DL (ref 31–37)
MCV RBC AUTO: 87.5 FL (ref 80–100)
MIXED CELL %: 12.6 %
NEUTROPHILS # BLD AUTO: 2 X10ˆ3/UL (ref 1.5–7.7)
NEUTROPHILS NFR BLD AUTO: 56.2 %
PLATELET # BLD AUTO: 227 X10ˆ3/UL (ref 150–450)
POCT BILIRUBIN URINE: NEGATIVE
POCT GLUCOSE URINE: NEGATIVE MG/DL
POCT LEUKOCYTE ESTERASE URINE: NEGATIVE
POCT NITRITE URINE: NEGATIVE
POCT PH URINE: 6 (ref 5–8)
POCT PROTEIN URINE: NEGATIVE MG/DL
POCT SPECIFIC GRAVITY URINE: 1.01
POCT URINE COLOR: YELLOW
POCT UROBILINOGEN URINE: 1 MG/DL
POTASSIUM BLD-SCNC: 3.4 MMOL/L (ref 3.6–5.1)
RBC # BLD AUTO: 4.09 X10ˆ6/UL
SARS-COV-2 RNA RESP QL NAA+PROBE: NOT DETECTED
SODIUM BLD-SCNC: 136 MMOL/L (ref 136–145)
TROPONIN I BLD-MCNC: <0.02 NG/ML
WBC # BLD AUTO: 3.7 X10ˆ3/UL (ref 4–11)

## 2023-03-27 PROCEDURE — 81002 URINALYSIS NONAUTO W/O SCOPE: CPT | Performed by: NURSE PRACTITIONER

## 2023-03-27 PROCEDURE — 93000 ELECTROCARDIOGRAM COMPLETE: CPT | Performed by: NURSE PRACTITIONER

## 2023-03-27 PROCEDURE — U0002 COVID-19 LAB TEST NON-CDC: HCPCS | Performed by: NURSE PRACTITIONER

## 2023-03-27 PROCEDURE — 80047 BASIC METABLC PNL IONIZED CA: CPT | Performed by: NURSE PRACTITIONER

## 2023-03-27 PROCEDURE — 84484 ASSAY OF TROPONIN QUANT: CPT | Performed by: NURSE PRACTITIONER

## 2023-03-27 PROCEDURE — 71046 X-RAY EXAM CHEST 2 VIEWS: CPT | Performed by: NURSE PRACTITIONER

## 2023-03-27 PROCEDURE — 85025 COMPLETE CBC W/AUTO DIFF WBC: CPT | Performed by: NURSE PRACTITIONER

## 2023-03-27 PROCEDURE — 99214 OFFICE O/P EST MOD 30 MIN: CPT | Performed by: NURSE PRACTITIONER

## 2023-03-27 RX ORDER — POTASSIUM CHLORIDE 20 MEQ/1
40 TABLET, EXTENDED RELEASE ORAL ONCE
Status: COMPLETED | OUTPATIENT
Start: 2023-03-27 | End: 2023-03-27

## 2023-03-27 RX ORDER — AMOXICILLIN AND CLAVULANATE POTASSIUM 875; 125 MG/1; MG/1
1 TABLET, FILM COATED ORAL 2 TIMES DAILY
Qty: 20 TABLET | Refills: 0 | Status: SHIPPED | OUTPATIENT
Start: 2023-03-27 | End: 2023-04-06

## 2023-03-27 NOTE — ED INITIAL ASSESSMENT (HPI)
Pt c/o cough, headache, fatigue and fever. Pt states she's had symptoms for 4 days. Pt states she's had 4 negative covid tests, she tested covid negative today.

## 2023-03-27 NOTE — TELEPHONE ENCOUNTER
Talked to pt and pt been sick for over 1 month. Pt would get better and then sick again. Pt has body ache, 99.7F temp, cough and fatigue. Pt tested negative at home covid test last week. Advised to do another covid test and let us know. appt made for pt for tomorrow. Advised if pt feels worse then to go to [facility]. Denies SOB and chest pain.     Future Appointments   Date Time Provider Jayson Brown   3/28/2023  1:40 PM TALISHA Vaughn [facility]

## 2023-03-28 LAB
ATRIAL RATE: 69 BPM
P AXIS: 40 DEGREES
P-R INTERVAL: 194 MS
Q-T INTERVAL: 394 MS
QRS DURATION: 92 MS
QTC CALCULATION (BEZET): 422 MS
R AXIS: 10 DEGREES
T AXIS: 53 DEGREES
VENTRICULAR RATE: 69 BPM

## 2023-03-28 NOTE — DISCHARGE INSTRUCTIONS
Use Mucinex for congestion and cough. Tylenol and Motrin alternating for pain. Increase oral fluids. Increase potassium rich foods. Take antibiotic as directed. Close follow-up with your primary care doctor as scheduled tomorrow.

## 2023-05-29 DIAGNOSIS — E03.9 ACQUIRED HYPOTHYROIDISM: Primary | ICD-10-CM

## 2023-05-29 DIAGNOSIS — E05.90 HYPERTHYROIDISM: ICD-10-CM

## 2023-05-31 NOTE — TELEPHONE ENCOUNTER
Pt returned call, she will check with her insurance to see if more labs will be covered. TSH was due to be rechecked in August 2022.

## 2023-06-02 RX ORDER — LEVOTHYROXINE SODIUM 88 UG/1
TABLET ORAL
Qty: 30 TABLET | Refills: 0 | Status: SHIPPED | OUTPATIENT
Start: 2023-06-02

## 2023-06-02 NOTE — TELEPHONE ENCOUNTER
Last OV relevant to medication: 10/10/22  Last refill date: 3/1/23     #/refills: 3  When pt was asked to return for OV: 1 year  Upcoming appt/reason: PE-none scheduled  Was pt informed of any over due labs:yes, pt wants to check with insurance plan re coverage first  T4, FREE (ng/dL)   Date Value   10/26/2020 1.6     TSH (mIU/L)   Date Value   05/16/2022 0.39 (L)     TSH W/REFLEX TO FT4 (mIU/L)   Date Value   10/26/2020 6.46 (H)

## 2023-07-06 DIAGNOSIS — E05.90 HYPERTHYROIDISM: Primary | ICD-10-CM

## 2023-07-06 DIAGNOSIS — E78.00 HYPERCHOLESTEROLEMIA: ICD-10-CM

## 2023-07-06 DIAGNOSIS — Z00.00 ENCOUNTER FOR ANNUAL PHYSICAL EXAM: ICD-10-CM

## 2023-07-06 DIAGNOSIS — E03.9 ACQUIRED HYPOTHYROIDISM: ICD-10-CM

## 2023-07-06 LAB — TSH: 2.64 MIU/L (ref 0.4–4.5)

## 2023-07-06 RX ORDER — LEVOTHYROXINE SODIUM 88 UG/1
88 TABLET ORAL
Qty: 90 TABLET | Refills: 0 | Status: SHIPPED | OUTPATIENT
Start: 2023-07-06

## 2023-07-11 ENCOUNTER — PATIENT MESSAGE (OUTPATIENT)
Dept: INTERNAL MEDICINE CLINIC | Facility: CLINIC | Age: 63
End: 2023-07-11

## 2023-07-11 NOTE — TELEPHONE ENCOUNTER
From: Lana Armenta  To: Oscar TALISHA  Sent: 7/11/2023 10:18 AM CDT  Subject: Question regarding ASSAY, THYROID STIM HORMONE    Deniz Christianson - regarding the Thyroid Rx sent 7/6: is it back to a 90 day Rx with refills? Thanks, Chaim, [phone].

## 2023-10-09 ENCOUNTER — OFFICE VISIT (OUTPATIENT)
Dept: INTERNAL MEDICINE CLINIC | Facility: CLINIC | Age: 63
End: 2023-10-09
Payer: COMMERCIAL

## 2023-10-09 VITALS
TEMPERATURE: 99 F | RESPIRATION RATE: 16 BRPM | BODY MASS INDEX: 27.61 KG/M2 | HEIGHT: 60 IN | OXYGEN SATURATION: 99 % | WEIGHT: 140.63 LBS | HEART RATE: 58 BPM | DIASTOLIC BLOOD PRESSURE: 60 MMHG | SYSTOLIC BLOOD PRESSURE: 110 MMHG

## 2023-10-09 DIAGNOSIS — E78.00 HYPERCHOLESTEROLEMIA: ICD-10-CM

## 2023-10-09 DIAGNOSIS — E03.9 ACQUIRED HYPOTHYROIDISM: ICD-10-CM

## 2023-10-09 DIAGNOSIS — Z78.0 POSTMENOPAUSAL: ICD-10-CM

## 2023-10-09 DIAGNOSIS — L98.9 SKIN LESION: ICD-10-CM

## 2023-10-09 DIAGNOSIS — F51.04 PSYCHOPHYSIOLOGICAL INSOMNIA: ICD-10-CM

## 2023-10-09 DIAGNOSIS — Z00.00 ENCOUNTER FOR ANNUAL PHYSICAL EXAM: Primary | ICD-10-CM

## 2023-10-09 DIAGNOSIS — Z12.31 ENCOUNTER FOR SCREENING MAMMOGRAM FOR MALIGNANT NEOPLASM OF BREAST: ICD-10-CM

## 2023-10-09 DIAGNOSIS — Z23 NEED FOR VACCINATION: ICD-10-CM

## 2023-10-09 PROCEDURE — 90686 IIV4 VACC NO PRSV 0.5 ML IM: CPT | Performed by: INTERNAL MEDICINE

## 2023-10-09 PROCEDURE — 3074F SYST BP LT 130 MM HG: CPT | Performed by: INTERNAL MEDICINE

## 2023-10-09 PROCEDURE — 3008F BODY MASS INDEX DOCD: CPT | Performed by: INTERNAL MEDICINE

## 2023-10-09 PROCEDURE — 3078F DIAST BP <80 MM HG: CPT | Performed by: INTERNAL MEDICINE

## 2023-10-09 PROCEDURE — 90471 IMMUNIZATION ADMIN: CPT | Performed by: INTERNAL MEDICINE

## 2023-10-09 RX ORDER — ESTRADIOL 0.1 MG/G
1 CREAM VAGINAL
COMMUNITY
Start: 2023-06-13

## 2023-10-09 RX ORDER — ALPRAZOLAM 0.5 MG/1
0.5 TABLET ORAL NIGHTLY PRN
Qty: 30 TABLET | Refills: 1 | Status: SHIPPED | OUTPATIENT
Start: 2023-10-09

## 2023-10-09 RX ORDER — PRAVASTATIN SODIUM 40 MG
40 TABLET ORAL DAILY
Qty: 90 TABLET | Refills: 1 | Status: SHIPPED | OUTPATIENT
Start: 2023-10-09

## 2023-10-09 NOTE — PATIENT INSTRUCTIONS
Get your labs done. You should be fasting for at least 10 hours. If you take a multivitamin with Biotin or any biotin product it should be held for 3 days prior to getting your labs done. Make an appointment to see gynecology     Get your mammogram done when due in December     Get your dexa scan done    COVID booster recommended    Flu shot recommended. Check with your insurance to verify coverage for the Shingrix vaccine for shingles. Also check to see where you can go to get the vaccine. You can get a price check at pharmacy.      Continue your current medication    Follow up in 1 year or sooner as needed

## 2023-10-12 RX ORDER — LEVOTHYROXINE SODIUM 88 UG/1
88 TABLET ORAL
Qty: 90 TABLET | Refills: 0 | Status: SHIPPED | OUTPATIENT
Start: 2023-10-12

## 2023-10-12 NOTE — TELEPHONE ENCOUNTER
Thyroid Supplements Protocol [id number]/12/2023 12:26 AM   Protocol Details TSH test in past 12 months    TSH value between 0.350 and 5.500 IU/ml    Appointment in past 12 or next 3 months       No future appointments.   Labs due

## 2023-10-21 LAB
ABSOLUTE BASOPHILS: 38 CELLS/UL (ref 0–200)
ABSOLUTE EOSINOPHILS: 184 CELLS/UL (ref 15–500)
ABSOLUTE LYMPHOCYTES: 1517 CELLS/UL (ref 850–3900)
ABSOLUTE MONOCYTES: 443 CELLS/UL (ref 200–950)
ABSOLUTE NEUTROPHILS: 3218 CELLS/UL (ref 1500–7800)
ALBUMIN/GLOBULIN RATIO: 1.6 (CALC) (ref 1–2.5)
ALBUMIN: 4.5 G/DL (ref 3.6–5.1)
ALKALINE PHOSPHATASE: 81 U/L (ref 37–153)
ALT: 20 U/L (ref 6–29)
AST: 20 U/L (ref 10–35)
BASOPHILS: 0.7 %
BILIRUBIN, TOTAL: 0.6 MG/DL (ref 0.2–1.2)
BUN: 16 MG/DL (ref 7–25)
CALCIUM: 10 MG/DL (ref 8.6–10.4)
CARBON DIOXIDE: 29 MMOL/L (ref 20–32)
CHLORIDE: 102 MMOL/L (ref 98–110)
CHOL/HDLC RATIO: 3.7 (CALC)
CHOLESTEROL, TOTAL: 217 MG/DL
CREATININE: 0.67 MG/DL (ref 0.5–1.05)
EGFR: 99 ML/MIN/1.73M2
EOSINOPHILS: 3.4 %
GLOBULIN: 2.9 G/DL (CALC) (ref 1.9–3.7)
GLUCOSE: 90 MG/DL (ref 65–99)
HDL CHOLESTEROL: 58 MG/DL
HEMATOCRIT: 41.6 % (ref 35–45)
HEMOGLOBIN: 14 G/DL (ref 11.7–15.5)
LDL-CHOLESTEROL: 137 MG/DL (CALC)
LYMPHOCYTES: 28.1 %
MCH: 29.9 PG (ref 27–33)
MCHC: 33.7 G/DL (ref 32–36)
MCV: 88.9 FL (ref 80–100)
MONOCYTES: 8.2 %
MPV: 10.3 FL (ref 7.5–12.5)
NEUTROPHILS: 59.6 %
NON-HDL CHOLESTEROL: 159 MG/DL (CALC)
PLATELET COUNT: 263 THOUSAND/UL (ref 140–400)
POTASSIUM: 4.3 MMOL/L (ref 3.5–5.3)
PROTEIN, TOTAL: 7.4 G/DL (ref 6.1–8.1)
RDW: 12.6 % (ref 11–15)
RED BLOOD CELL COUNT: 4.68 MILLION/UL (ref 3.8–5.1)
SODIUM: 140 MMOL/L (ref 135–146)
TRIGLYCERIDES: 108 MG/DL
TSH: 0.2 MIU/L (ref 0.4–4.5)
WHITE BLOOD CELL COUNT: 5.4 THOUSAND/UL (ref 3.8–10.8)

## 2023-10-24 DIAGNOSIS — E03.9 ACQUIRED HYPOTHYROIDISM: Primary | ICD-10-CM

## 2024-01-02 ENCOUNTER — HOSPITAL ENCOUNTER (OUTPATIENT)
Age: 64
Discharge: HOME | End: 2024-01-02
Payer: COMMERCIAL

## 2024-01-02 VITALS
DIASTOLIC BLOOD PRESSURE: 83 MMHG | WEIGHT: 135 LBS | HEIGHT: 61 IN | BODY MASS INDEX: 25.49 KG/M2 | OXYGEN SATURATION: 96 % | HEART RATE: 72 BPM | RESPIRATION RATE: 16 BRPM | SYSTOLIC BLOOD PRESSURE: 115 MMHG | TEMPERATURE: 98 F

## 2024-01-02 DIAGNOSIS — R05.3 PERSISTENT COUGH: ICD-10-CM

## 2024-01-02 DIAGNOSIS — J01.40 ACUTE PANSINUSITIS, RECURRENCE NOT SPECIFIED: ICD-10-CM

## 2024-01-02 DIAGNOSIS — R09.82 PND (POST-NASAL DRIP): Primary | ICD-10-CM

## 2024-01-02 PROCEDURE — 99213 OFFICE O/P EST LOW 20 MIN: CPT | Performed by: PHYSICIAN ASSISTANT

## 2024-01-02 RX ORDER — AMOXICILLIN 875 MG/1
875 TABLET, COATED ORAL 2 TIMES DAILY
Qty: 20 TABLET | Refills: 0 | Status: SHIPPED | OUTPATIENT
Start: 2024-01-02 | End: 2024-01-12

## 2024-01-02 RX ORDER — DEXAMETHASONE 4 MG/1
8 TABLET ORAL ONCE
Status: COMPLETED | OUTPATIENT
Start: 2024-01-02 | End: 2024-01-02

## 2024-01-02 RX ORDER — PREDNISONE 20 MG/1
40 TABLET ORAL DAILY
Qty: 8 TABLET | Refills: 0 | Status: SHIPPED | OUTPATIENT
Start: 2024-01-02 | End: 2024-01-06

## 2024-01-02 RX ORDER — BENZONATATE 100 MG/1
100 CAPSULE ORAL 3 TIMES DAILY PRN
Qty: 30 CAPSULE | Refills: 0 | Status: SHIPPED | OUTPATIENT
Start: 2024-01-02 | End: 2024-02-01

## 2024-01-02 NOTE — ED PROVIDER NOTES
Patient Seen in: [facility]      History     Chief Complaint   Patient presents with    Cough/URI     Sinus     Stated Complaint: congestion    Subjective:   HPI    63-year-old female here with complaint of postnasal drip for the past week with persistent cough and now with sinus pain and pressure.  Patient denies chest pain, shortness of breath, abdominal pain, nausea, vomiting or diarrhea.  Patient is tolerating p.o. speaking full sentences.  Afebrile.    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 Alba    Frozen shoulder 8/14    right shoulder    H/O bone density study 12/08/2016    T score -1.9, osteopenia    H/O pelvic ultrasound 05/27/2016    Fibroid (14.06x15.79)    H/O pelvic ultrasound 05/27/2016    Fibroids 12.06x15.79, uterus length 3.08 uterus height 2.29 per Jose R Alba MD records    H/O pelvic ultrasound 11/30/2012    Uterus Length 28mm, Uterus width 23mm, per Jose R Alba MD records    H/O pelvic ultrasound 05/29/2014    Normal    Hemorrhoids Dec 2021    Discovered during colonscopy    High cholesterol     History of pelvic ultrasound 01/19/2022 1/19/22 Pelvic US - bicornuate uterus with 2 mm & 3 mm endometrial stripes. Probable 1.9 cm fibroid left side. Normal left ovary. Right ovary not seen. No free fluid. Pubic mons 1 cm simple appearing subcutaneous cyst.     Hypercholesterolemia     Hypothyroidism     Infertility management     Bicornuate uterus, Fibroids     Liver lesion 7/13/2011    small hyperechoic lesion w/in the liver     Loose body, joint 6/28/2009    Midepigastric pain      Osteopenia 12/08/2016    Bone density test, T score -1.9    Osteopenia 2011    Pain in joints     Meniscus tears, rotator cuff tear    Pap smear for cervical cancer screening 01/10/2022    Pap & HPV negative.     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     Wears glasses     Reading glasses              The patient's medication list, past medical history and social history elements  as listed in today's nurse's notes are reviewed and agree.   The patient's family history is reviewed and is noncontributory to the presenting problem, except as indicated as above.   Past Surgical History:   Procedure Laterality Date    COLONOSCOPY      COLONOSCOPY & POLYPECTOMY  12/06/2021    Tubular adenoma of transverse colon removed. Recall 7 years     LAPAROSCOPY PROCEDURE UNLISTED  1995    Fibroids    LAPAROSCOPY PROCEDURE UNLISTED  1989    Fibroids    LAPAROSCOPY,REMOVE MYOMA  1984    Dx bicornuate uterus, myomectomy     OTHER SURGICAL HISTORY  2004    b/l Lasix     REMOVE TONSILS/ADENOIDS,<12 Y/O  1967    SINUS SURGERY    12/2004    B/L endoscopic sinus surery, a nasal septal reconstruction, and a submucous resection of the inferior turbinate    TONSILLECTOMY  1970                Social History     Socioeconomic History    Marital status: Single   Tobacco Use    Smoking status: Never    Smokeless tobacco: Never    Vaping Use    Vaping Use: Never used   Substance and Sexual Activity    Alcohol use: Yes     Alcohol/week: 1.0 standard drink of alcohol     Types: 1 Glasses of wine per week     Comment: 1 drink per week    Drug use: No    Sexual activity: Yes     Partners: Male   Other Topics Concern    Caffeine Concern Yes     Comment: approx 1 cup of coffee, or ice tea in the summer    Stress Concern No    Weight Concern No    Special Diet No    Exercise Yes     Comment: 1-2 days per week, step aerobics, walking the dog     Seat Belt Yes              Review of Systems    Positive for stated complaint: congestion  Other systems are as noted in HPI.  Constitutional and vital signs reviewed.      All other systems reviewed and negative except as noted above.    Physical Exam     ED Triage Vitals [01/02/24 1222]   /83   Pulse 72   Resp 16   Temp 97.9 °F (36.6 °C)   Temp src    SpO2 96 %   O2 Device None (Room air)       Current:/83   Pulse 72   Temp 97.9 °F (36.6 °C)   Resp 16   Ht 154.9 cm (5' 1\")   Wt 61.2 kg   SpO2 96%   BMI 25.51 kg/m²         Physical Exam  Vitals and nursing note reviewed.   Constitutional:       Appearance: Normal appearance. She is well-developed.   HENT:      Head: Normocephalic.      Jaw: There is normal jaw occlusion.      Right Ear: External ear normal. Tympanic membrane is bulging.      Left Ear: External ear normal. Tympanic membrane is bulging.      Nose: Congestion and rhinorrhea present. Rhinorrhea is clear.      Right Sinus: Maxillary sinus tenderness and frontal sinus tenderness present.      Left Sinus: Maxillary sinus tenderness and frontal sinus tenderness present.      Mouth/Throat:      Lips: Pink.      Mouth: Mucous membranes are moist.      Pharynx: Oropharynx is clear.      Comments: Uvula midline: No trismus or drooling, no peritonsillar abscess noted moderate cobblestoning the posterior pharynx  Eyes:      Conjunctiva/sclera: Conjunctivae normal.      Pupils: Pupils  are equal, round, and reactive to light.   Cardiovascular:      Rate and Rhythm: Normal rate and regular rhythm.      Heart sounds: Normal heart sounds.   Pulmonary:      Effort: Pulmonary effort is normal.      Breath sounds: Normal breath sounds.   Musculoskeletal:      Cervical back: Normal range of motion and neck supple.   Skin:     General: Skin is warm.      Capillary Refill: Capillary refill takes less than 2 seconds.   Neurological:      Mental Status: She is alert and oriented to person, place, and time.   Psychiatric:         Behavior: Behavior normal.         Thought Content: Thought content normal.         Judgment: Judgment normal.             ED Course                      MDM   Clinical Impression: PND/persistent cough/sinusitis  Course of Treatment:   The decadron will work in your system the next several days.  You may start the additional prednisone on day 2 or 3 if symptoms persist.  Recommend taking an over the counter antihistamine daily: IE zyrtec/claritin.  Sleep more upright. Use chloraseptic spray to help stop the cough trigger reflex.  Push fluids and gargle with warm saline rinses.   The full course antibiotics as prescribed in tandem with a probiotic daily.  If symptoms persist or worsen make a follow-up appointment with your primary care physician and/or ENT for further evaluation and treatment.      The patient is encouraged to return if any concerning symptoms arise. Additional verbal discharge instructions are given and discussed. Discharge medications are discussed. The patient is in good condition throughout the visit today and remains so upon discharge. I discuss the plan of care with the patient, who expresses understanding. All questions and concerns are addressed to the patient's satisfaction prior to discharge today.  Previous conversations with PCP and charts were reviewed.                                       Medical Decision Making      Disposition and Plan     Clinical  Impression:  1. PND (post-nasal drip)    2. Acute pansinusitis, recurrence not specified    3. Persistent cough         Disposition:  Discharge  1/2/2024  1:16 pm    Follow-up:  Maxine Correa MD  [address] [id number] [phone]                Medications Prescribed:  Current Discharge Medication List        START taking these medications    Details   predniSONE 20 MG Oral Tab Take 2 tablets (40 mg total) by mouth daily for 4 days. Start on day 2-3 if symptoms persist  Qty: 8 tablet, Refills: 0      amoxicillin 875 MG Oral Tab Take 1 tablet (875 mg total) by mouth 2 (two) times daily for 10 days.  Qty: 20 tablet, Refills: 0      benzonatate 100 MG Oral Cap Take 1 capsule (100 mg total) by mouth 3 (three) times daily as needed for cough.  Qty: 30 capsule, Refills: 0

## 2024-01-02 NOTE — DISCHARGE INSTRUCTIONS
Please return to the ER/clinic if symptoms worsen. Follow-up with your PCP in 24-48 hours as needed.    The decadron will work in your system the next several days.  You may start the additional prednisone on day 2 or 3 if symptoms persist.  Recommend taking an over the counter antihistamine daily: IE zyrtec/claritin.  Sleep more upright. Use chloraseptic spray to help stop the cough trigger reflex.  Push fluids and gargle with warm saline rinses.   The full course antibiotics as prescribed in tandem with a probiotic daily.  If symptoms persist or worsen make a follow-up appointment with your primary care physician and/or ENT for further evaluation and treatment.

## 2024-01-18 RX ORDER — LEVOTHYROXINE SODIUM 88 UG/1
88 TABLET ORAL
Qty: 90 TABLET | Refills: 0 | OUTPATIENT
Start: 2024-01-18

## 2024-01-18 NOTE — TELEPHONE ENCOUNTER
[facility] pharmacy informed of dose change. Patient confirmed she is taking the 75 mcg.

## 2024-01-20 DIAGNOSIS — E03.9 ACQUIRED HYPOTHYROIDISM: ICD-10-CM

## 2024-01-22 RX ORDER — LEVOTHYROXINE SODIUM 0.07 MG/1
75 TABLET ORAL
Qty: 90 TABLET | Refills: 0 | OUTPATIENT
Start: 2024-01-22

## 2024-01-22 NOTE — TELEPHONE ENCOUNTER
Rx Denied - Needs Lab Work Done First.... Spoke to patient and she does not need the refill at this time.  Stated she started the medication later than prescribed so she still has some left.  Advised pt that once she gets down to a weeks worth of meds left to go get labs done.  Pt was understanding of these instructions.

## 2024-02-09 ENCOUNTER — TELEPHONE (OUTPATIENT)
Dept: INTERNAL MEDICINE CLINIC | Facility: CLINIC | Age: 64
End: 2024-02-09

## 2024-02-09 DIAGNOSIS — E78.00 HYPERCHOLESTEROLEMIA: Primary | ICD-10-CM

## 2024-04-09 ENCOUNTER — HOSPITAL ENCOUNTER (OUTPATIENT)
Dept: MAMMOGRAPHY | Facility: HOSPITAL | Age: 64
Discharge: HOME | End: 2024-04-09
Attending: NURSE PRACTITIONER
Payer: COMMERCIAL

## 2024-04-09 DIAGNOSIS — Z12.31 ENCOUNTER FOR SCREENING MAMMOGRAM FOR MALIGNANT NEOPLASM OF BREAST: ICD-10-CM

## 2024-04-09 PROCEDURE — 77063 BREAST TOMOSYNTHESIS BI: CPT | Performed by: NURSE PRACTITIONER

## 2024-04-09 PROCEDURE — 77067 SCR MAMMO BI INCL CAD: CPT | Performed by: NURSE PRACTITIONER

## 2024-05-09 ENCOUNTER — TELEPHONE (OUTPATIENT)
Dept: INTERNAL MEDICINE CLINIC | Facility: CLINIC | Age: 64
End: 2024-05-09

## 2024-05-09 RX ORDER — ROSUVASTATIN CALCIUM 20 MG/1
20 TABLET, COATED ORAL NIGHTLY
Qty: 30 TABLET | Refills: 0 | Status: SHIPPED | OUTPATIENT
Start: 2024-05-09

## 2024-05-09 RX ORDER — LEVOTHYROXINE SODIUM 0.07 MG/1
75 TABLET ORAL
Qty: 30 TABLET | Refills: 0 | Status: SHIPPED | OUTPATIENT
Start: 2024-05-09

## 2024-05-09 NOTE — TELEPHONE ENCOUNTER
Cholesterol Medication Protocol [id number]/09/2024 12:24 AM   Protocol Details ALT < 80    ALT resulted within past year    Lipid panel within past 12 months    In person appointment or virtual visit in the past 12 mos or appointment in next 3 mos   2. Hypercholesterolemia  - on statin.  - improve low fat diet and exercise     Thyroid Medication Protocol [id number]/09/2024 12:24 AM   Protocol Details TSH in past 12 months    Last TSH value is normal    In person appointment or virtual visit in the past 12 mos or appointment in next 3 mos      3. Acquired hypothyroidism  - continue levothyroxine  - check TSH  No future appointments.

## 2024-05-09 NOTE — TELEPHONE ENCOUNTER
Patient wants to wait until she does her blood work for her medication refills. She states it's cheaper to get 90 day supply after she has had her lab work done. She does not want the refill orders for rosuvastatin and levothyroxine. She states she has contacted the pharmacy and told them not to auto refill because it only gives her 30 day supply.     Please contact this patient because now she says she going forward they can be submitted automatically, she says the problem with the pharmacy will fix itself probably if she does not pick the prescriptions up.    [phone]

## 2024-05-14 ENCOUNTER — TELEPHONE (OUTPATIENT)
Dept: INTERNAL MEDICINE CLINIC | Facility: CLINIC | Age: 64
End: 2024-05-14

## 2024-05-14 NOTE — TELEPHONE ENCOUNTER
Paged by patient in am. Patient is currently in [facility] and wanted to know what labs were ordered for her. All answers provided. TSH, Lipid panel and CMP were ordered by Trisha Gordon during last patient's visit.

## 2024-05-15 ENCOUNTER — TELEPHONE (OUTPATIENT)
Dept: INTERNAL MEDICINE CLINIC | Facility: CLINIC | Age: 64
End: 2024-05-15

## 2024-05-15 DIAGNOSIS — E03.9 ACQUIRED HYPOTHYROIDISM: ICD-10-CM

## 2024-05-15 DIAGNOSIS — Z00.00 ENCOUNTER FOR ANNUAL PHYSICAL EXAM: ICD-10-CM

## 2024-05-15 DIAGNOSIS — E78.00 HYPERCHOLESTEROLEMIA: Primary | ICD-10-CM

## 2024-05-15 RX ORDER — ROSUVASTATIN CALCIUM 20 MG/1
20 TABLET, COATED ORAL NIGHTLY
Qty: 90 TABLET | Refills: 0 | Status: SHIPPED | OUTPATIENT
Start: 2024-05-15 | End: 2024-05-15

## 2024-05-15 RX ORDER — LEVOTHYROXINE SODIUM 0.07 MG/1
75 TABLET ORAL
Qty: 90 TABLET | Refills: 1 | Status: SHIPPED | OUTPATIENT
Start: 2024-05-15

## 2024-05-15 RX ORDER — LEVOTHYROXINE SODIUM 0.07 MG/1
75 TABLET ORAL
Qty: 90 TABLET | Refills: 0 | Status: SHIPPED | OUTPATIENT
Start: 2024-05-15 | End: 2024-05-15

## 2024-05-15 RX ORDER — ROSUVASTATIN CALCIUM 20 MG/1
20 TABLET, COATED ORAL NIGHTLY
Qty: 90 TABLET | Refills: 1 | Status: SHIPPED | OUTPATIENT
Start: 2024-05-15

## 2024-05-15 NOTE — TELEPHONE ENCOUNTER
Patient states she phoned before and did not receive a c/b. She is frustrated with her statins being stronger than ususal and she wants to know why blood tests were ordered that she does not normally have to take. She states she wants to speak to a nurse.

## 2024-05-15 NOTE — TELEPHONE ENCOUNTER
Did not receive any calls unfortunately. Spoke to patient, she wanted to confirm why CMP was needed- advised it is for monitoring after changing statin as we monitor liver function and hers was wnl and she verbalized understanding. Requested refills for 90/1, refills sent to [facility] for 6 month supply. States she has noticed some aches and is unsure if it is related to statin. Some plateau with weight loss as well despite diet/exercise. She will continue current doses of meds and will schedule appointment to discuss further. Abimael, thanks!

## 2024-07-15 ENCOUNTER — OFFICE VISIT (OUTPATIENT)
Dept: INTERNAL MEDICINE CLINIC | Facility: CLINIC | Age: 64
End: 2024-07-15
Payer: COMMERCIAL

## 2024-07-15 ENCOUNTER — TELEPHONE (OUTPATIENT)
Dept: INTERNAL MEDICINE CLINIC | Facility: CLINIC | Age: 64
End: 2024-07-15

## 2024-07-15 VITALS
RESPIRATION RATE: 16 BRPM | TEMPERATURE: 97 F | OXYGEN SATURATION: 98 % | WEIGHT: 140.63 LBS | HEIGHT: 61 IN | BODY MASS INDEX: 26.55 KG/M2 | HEART RATE: 60 BPM | DIASTOLIC BLOOD PRESSURE: 60 MMHG | SYSTOLIC BLOOD PRESSURE: 110 MMHG

## 2024-07-15 DIAGNOSIS — Z23 NEED FOR VACCINATION: ICD-10-CM

## 2024-07-15 DIAGNOSIS — S40.811A ABRASION OF RIGHT UPPER EXTREMITY, INITIAL ENCOUNTER: Primary | ICD-10-CM

## 2024-07-15 NOTE — TELEPHONE ENCOUNTER
Pt informed and verbalized understanding. She has had cold sores outside of this encounter in the past which have always been localized to the lips/mouth and treated with conservative manor so she would like to proceed with appointment/vaccination. She is working currently but will be able to come in at 4:40, appointment scheduled. We do have benadryl in office in bin labeled \"Diphenhydramine\" as well as emergency kit. Sapphirei, thanks!
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
## 2024-07-15 NOTE — TELEPHONE ENCOUNTER
Unfortunately we cannot give her a tetanus vaccine if she is allergic to it. Does she remember what her reaction was? Please find out more. Can we get her in to see an allergist in the next day or two to see if this is a real allergy? We can try anyone available at dr. Cole's office or with duly dr. Garcia or anyone available quickly.

## 2024-07-15 NOTE — TELEPHONE ENCOUNTER
Spoke to pt, she states she had vaccine in 2006 after she cut finger on metal part of container. The next day she noticed what she refers to as fever blisters around her mouth that were like cold sores. She did not have any other rash or symptoms such as swelling of lips/tongue/throat or shortness of breath. She did not seek care for these symptoms at the time, reported them later at a physical exam, has not had testing for it. Saturday she cut her bicep area on mailbox, it is not deep but it is long. She is keeping it clean/covered and used neosporin. Gave 's recommendations, advised we can see if there are any openings with allergists soon and go from there and she verbalized understanding. She had questions about tetanus exposure and infection which I answered to the best of the ability.     Called 's office, they wouldn't be able to see her until next week at earliest and she said she wasn't sure if they would do testing at the same time, no guarantee.     Called 's office, had to leave message to call back. If no appointments, please advise on next steps. Pt said she was wiling to get vaccine and monitor for symptoms or go without vaccine as well but was nervous about exposure to tetanus, I did advise her this is up to her still but if you can advise on next steps, thanks!

## 2024-07-15 NOTE — TELEPHONE ENCOUNTER
Noted her reaction of cold sores or fever blisters around her mouth. No severe reaction to the vaccine.   I would recommend she get the tdap but need to get some more history about the blisters first such as does she already have a history of cold sores in the past? Also we will monitor her for an immediate reaction after we give it to her. Please offer her an appt today or tomorrow so we can do this in a timely manner.
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
## 2024-07-16 NOTE — TELEPHONE ENCOUNTER
Spoke to pt, she is doing fine and has had no symptoms or SE to the vaccine. No breakouts as of today. She is wondering if we can remove this from allergy list since she tolerated it okay. Please advise thanks!!

## 2024-09-16 ENCOUNTER — OFFICE VISIT (OUTPATIENT)
Dept: INTERNAL MEDICINE CLINIC | Facility: CLINIC | Age: 64
End: 2024-09-16
Payer: COMMERCIAL

## 2024-09-16 VITALS
HEART RATE: 66 BPM | BODY MASS INDEX: 28.37 KG/M2 | RESPIRATION RATE: 16 BRPM | TEMPERATURE: 99 F | OXYGEN SATURATION: 97 % | HEIGHT: 60 IN | DIASTOLIC BLOOD PRESSURE: 62 MMHG | SYSTOLIC BLOOD PRESSURE: 108 MMHG | WEIGHT: 144.5 LBS

## 2024-09-16 DIAGNOSIS — N89.8 VAGINAL IRRITATION: ICD-10-CM

## 2024-09-16 DIAGNOSIS — Z12.83 SCREENING FOR SKIN CANCER: ICD-10-CM

## 2024-09-16 DIAGNOSIS — Z13.220 SCREENING FOR CHOLESTEROL LEVEL: ICD-10-CM

## 2024-09-16 DIAGNOSIS — Z12.31 ENCOUNTER FOR SCREENING MAMMOGRAM FOR BREAST CANCER: ICD-10-CM

## 2024-09-16 DIAGNOSIS — Z13.29 SCREENING FOR THYROID DISORDER: ICD-10-CM

## 2024-09-16 DIAGNOSIS — R53.83 FATIGUE, UNSPECIFIED TYPE: ICD-10-CM

## 2024-09-16 DIAGNOSIS — E03.9 ACQUIRED HYPOTHYROIDISM: ICD-10-CM

## 2024-09-16 DIAGNOSIS — E78.00 HYPERCHOLESTEROLEMIA: ICD-10-CM

## 2024-09-16 DIAGNOSIS — F51.04 PSYCHOPHYSIOLOGICAL INSOMNIA: ICD-10-CM

## 2024-09-16 DIAGNOSIS — Z78.0 POSTMENOPAUSAL: ICD-10-CM

## 2024-09-16 DIAGNOSIS — Z00.00 ENCOUNTER FOR ANNUAL PHYSICAL EXAM: Primary | ICD-10-CM

## 2024-09-16 RX ORDER — ESTRADIOL 0.1 MG/G
1 CREAM VAGINAL
Qty: 42.5 G | Refills: 0 | Status: SHIPPED | OUTPATIENT
Start: 2024-09-16

## 2024-09-16 RX ORDER — LEVOTHYROXINE SODIUM 75 UG/1
75 TABLET ORAL
Qty: 90 TABLET | Refills: 1 | Status: SHIPPED | OUTPATIENT
Start: 2024-09-16

## 2024-09-16 RX ORDER — ROSUVASTATIN CALCIUM 20 MG/1
20 TABLET, COATED ORAL NIGHTLY
Qty: 90 TABLET | Refills: 1 | Status: SHIPPED | OUTPATIENT
Start: 2024-09-16

## 2024-09-16 RX ORDER — ALPRAZOLAM 0.5 MG
0.5 TABLET ORAL NIGHTLY PRN
Qty: 30 TABLET | Refills: 1 | Status: SHIPPED | OUTPATIENT
Start: 2024-09-16

## 2024-09-16 NOTE — PROGRESS NOTES
CHIEF COMPLAINT   Complete physical, med check    HPI:   Mercedes Hardy is a 63 year old female who presents for a complete physical exam, med check.     Wt Readings from Last 6 Encounters:   09/16/24 144 lb 8 oz (65.5 kg)   07/15/24 140 lb 9.6 oz (63.8 kg)   01/02/24 135 lb (61.2 kg)   10/09/23 140 lb 9.6 oz (63.8 kg)   03/03/23 130 lb (59 kg)   10/10/22 134 lb (60.8 kg)     Body mass index is 28.22 kg/m².     Diet and exercise are fair to good. Vaccines reviewed. Wearing seat belt and no texting and driving. Feels safe at home. Pap UTD. Sees gyne. Mammo to be ordered. Dexa to be ordered. Colon cancer screening UTD. No smoking. Occasional alcohol. Would like to see derm. Labs to be done.     HLD- on statin. No SE. Could do better with lifestyle changes.     Thyroid- last TSH was stable. No SE to med. Needs repeat labs.     Has fatigue- is not sure where it is coming from. Sleep is not great. Has stress. Some anxiety that she feels is manageable.     Insomnia- xanax at times. Works well. No SE.     Right knee more than left. Going down stairs and positional. Left shoulder has pain. All are manageable.        CHOLESTEROL, TOTAL (mg/dL)   Date Value   05/14/2024 153   02/07/2024 223 (H)   10/20/2023 217 (H)     HDL CHOLESTEROL (mg/dL)   Date Value   05/14/2024 56   02/07/2024 60   10/20/2023 58     LDL-CHOLESTEROL (mg/dL (calc))   Date Value   05/14/2024 78   02/07/2024 138 (H)   10/20/2023 137 (H)     AST (U/L)   Date Value   05/14/2024 18   10/20/2023 20   10/21/2022 21     ALT (U/L)   Date Value   05/14/2024 14   10/20/2023 20   10/21/2022 16        Current Outpatient Medications   Medication Sig Dispense Refill    levothyroxine 75 MCG Oral Tab Take 1 tablet (75 mcg total) by mouth before breakfast. 90 tablet 1    rosuvastatin 20 MG Oral Tab Take 1 tablet (20 mg total) by mouth nightly. 90 tablet 1    ALPRAZolam 0.5 MG Oral Tab Take 1 tablet (0.5 mg total) by mouth nightly as needed. 30 tablet 1    Ascorbic  Acid (VITAMIN C OR) Take  by mouth daily as needed.      Cholecalciferol (VITAMIN D) 1000 UNITS Oral Tab Take 1 tablet by mouth daily.      MULTIVITAMINS OR TABS 1 TABLET DAILY      CALCIUM & MAGNESIUM CARBONATES OR Take 1 tablet by mouth daily.      ADVIL 200 MG OR TABS Take 3 tablets (600 mg total) by mouth every 8 (eight) hours as needed.      estradiol 0.1 MG/GM Vaginal Cream Place 1 g vaginally twice a week. (Patient not taking: Reported on 7/15/2024)        No Active Allergies   Past Medical History:    Abdominal pain    Some tenderness    Anxiety    Grief related- care and loss of family members    Arthritis    Rt knee    Bicornuate uterus    Contracture of upper arm joint    Dislocation of radial head    right side     Elbow arthritis    Fibroids    5/27/16 Pelvic US - fibroid 14 x 15.79 (mm presumably) - outside records Dr. Alba    Frozen shoulder    right shoulder    H/O bone density study    T score -1.9, osteopenia    H/O pelvic ultrasound    Fibroid (14.06x15.79)    H/O pelvic ultrasound    Fibroids 12.06x15.79, uterus length 3.08 uterus height 2.29 per Jose R Alba MD records    H/O pelvic ultrasound    Uterus Length 28mm, Uterus width 23mm, per Jose R Alba MD records    H/O pelvic ultrasound    Normal    Hemorrhoids    Discovered during colonscopy    High cholesterol    History of pelvic ultrasound    1/19/22 Pelvic US - bicornuate uterus with 2 mm & 3 mm endometrial stripes. Probable 1.9 cm fibroid left side. Normal left ovary. Right ovary not seen. No free fluid. Pubic mons 1 cm simple appearing subcutaneous cyst.     Hypercholesterolemia    Hyperlipidemia    Hypothyroidism    Infertility management    Bicornuate uterus, Fibroids     Liver lesion    small hyperechoic lesion w/in the liver     Loose body, joint    Midepigastric pain    Osteopenia    Bone density test, T score -1.9    Osteopenia    Pain in joints    Meniscus tears, rotator cuff tear    Pap smear for cervical cancer screening    Pap &  HPV negative.     Plantar fasciitis    improved     Premature menopause    per outside records    Primary localized osteoarthrosis, upper arm    Screening mammogram for breast cancer    Benign    Sesamoiditis    tibial    Stool incontinence    Sometimes soft, somttimes small and hard    Tibia fracture    X-rays taken reveal a hairline fx through the tibial sesamoid area    Torn rotator cuff    left shoulder    Tubular adenoma of colon    Tubular adenoma of transverse colon     Vertigo    Vulvar lesion    1/19/22 Pelvic US - Pubic mons 1 cm simple appearing subcutaneous cyst.     Wears glasses    Reading glasses      Past Surgical History:   Procedure Laterality Date    Colonoscopy      Colonoscopy & polypectomy  12/06/2021    Tubular adenoma of transverse colon removed. Recall 7 years     Laparoscopy procedure unlisted  1995    Fibroids    Laparoscopy procedure unlisted  1989    Fibroids    Laparoscopy,remove myoma  1984    Dx bicornuate uterus, myomectomy     Other surgical history  2004    b/l Lasix     Remove tonsils/adenoids,<12 y/o  1967    Sinus surgery    12/2004    B/L endoscopic sinus surery, a nasal septal reconstruction, and a submucous resection of the inferior turbinate    Tonsillectomy  1970      Family History   Problem Relation Age of Onset    Heart Attack Father 68    Other (Other) Mother 83        aplastic anemia    Other (hip fracture) Mother     Stroke Mother     Anemia Mother         Aplastic anemia    Diabetes Maternal Grandmother 75    High Cholesterol Brother     Diabetes Brother         unknown    Heart Surgery Sister         heart valve replaced    Diabetes Other         grandmother    Heart Disease Paternal Aunt         CHF    Breast Cancer Neg       Social History:   Social History     Socioeconomic History    Marital status: Single   Tobacco Use    Smoking status: Never    Smokeless tobacco: Never   Vaping Use    Vaping status: Never Used   Substance and Sexual Activity    Alcohol use:  Yes     Alcohol/week: 1.0 standard drink of alcohol     Types: 1 Glasses of wine per week     Comment: 1 drink per week    Drug use: No    Sexual activity: Yes     Partners: Male   Other Topics Concern    Caffeine Concern Yes    Stress Concern No    Weight Concern No    Special Diet No    Exercise Yes    Seat Belt Yes        REVIEW OF SYSTEMS:   GENERAL: feels well otherwise  SKIN: no complaint of any unusual skin lesions  EYES: no complaint of blurred vision or double vision  HEENT: no complaint of nasal congestion, sinus pain or ST  LUNGS: no complaint of shortness of breath with exertion  CARDIOVASCULAR: no complaint of chest pain on exertion  GI: no complaint of pain,denies heartburn  : no complaints of vaginal discharge or urinary complaints  MUSCULOSKELETAL: no complaint of back pain  NEURO: no complaint of headaches  PSYCHE: no complaint of depression    HEMATOLOGIC: denies hx of anemia    EXAM:   /62 (BP Location: Left arm, Patient Position: Sitting, Cuff Size: adult)   Pulse 66   Temp 98.5 °F (36.9 °C) (Temporal)   Resp 16   Ht 5' (1.524 m)   Wt 144 lb 8 oz (65.5 kg)   SpO2 97%   BMI 28.22 kg/m²   Body mass index is 28.22 kg/m².   GENERAL: well developed, well nourished,in no apparent distress  SKIN: no rashes,no suspicious lesions  HEENT: atraumatic, normocephalic,ears are clear  EYES:PERRLA, EOMI, conjunctiva are clear  NECK: supple,no adenopathy, no thyroid masses.  BREAST:DEFERRED TO GYNE  LUNGS: clear to auscultation; no rhonchi, rales, or wheezing  CARDIO: RRR without murmur  GI: no tenderness or masses  :DEFERRED TO GYNE   MUSCULOSKELETAL: No obvious joint deformity or swelling.  Normal gait.  EXTREMITIES: no edema  NEURO: Oriented times three,cranial nerves are grossly intact,motor and sensory are grossly intact    LABS:     Lab Results   Component Value Date    WBC 5.4 10/20/2023    RBC 4.68 10/20/2023    HGB 14.0 10/20/2023    HCT 41.6 10/20/2023    MCV 88.9 10/20/2023    MCH 29.9  10/20/2023    MCHC 33.7 10/20/2023    RDW 12.6 10/20/2023     10/20/2023      Lab Results   Component Value Date    GLU 94 05/14/2024    BUN 9 05/14/2024    BUNCREA SEE NOTE: 05/14/2024    CREATSERUM 0.80 05/14/2024    GFRNAA 96 10/20/2021    GFRAA 111 10/20/2021    CA 9.8 05/14/2024    ALKPHO 73 05/14/2024    AST 18 05/14/2024    ALT 14 05/14/2024    BILT 0.6 05/14/2024    TP 6.9 05/14/2024    ALB 4.4 05/14/2024    GLOBULIN 2.5 05/14/2024    AGRATIO 1.8 05/14/2024     05/14/2024    K 4.3 05/14/2024     05/14/2024    CO2 29 05/14/2024      Lab Results   Component Value Date    CHOLEST 153 05/14/2024    TRIG 108 05/14/2024    HDL 56 05/14/2024    LDL 78 05/14/2024    VLDL 33 10/30/2018    TCHDLRATIO 2.7 05/14/2024    NONHDLC 97 05/14/2024      Lab Results   Component Value Date    T4F 1.6 10/26/2020    TSH 2.33 05/14/2024    TSHT4 6.46 (H) 10/26/2020      Lab Results   Component Value Date    A1C 5.6 10/26/2020       ASSESSMENT AND PLAN:   1. Encounter for annual physical exam  - Mercedes Hardy is a 62 year old female who presents for a complete physical exam.  Pap UTD and pelvic deferred to gyne. Reviewed diet and exercise.  Pt' s weight is Body mass index is 27.46 kg/m². Recommended regular exercise.  Labs to be done- pt reminded. Vaccines reviewed. C scope UTD. Mammo ordered. Dexa ordered. Derm referral given.     2. Hypercholesterolemia  - low fat diet, exercise  - continue statin  - get heart scan  - rosuvastatin 20 MG Oral Tab; Take 1 tablet (20 mg total) by mouth nightly.  Dispense: 90 tablet; Refill: 1    3. Acquired hypothyroidism  - get repeat lab  - continue levothyroxine  - levothyroxine 75 MCG Oral Tab; Take 1 tablet (75 mcg total) by mouth before breakfast.  Dispense: 90 tablet; Refill: 1    4. Fatigue, unspecified type  - ongoing. Not getting sleep all the time. Is not sure if it is that.   - check vitamins  - VITAMIN D, 25-HYDROXY [63035][Q]  - VITAMIN B12 [927][Q]    5.  Psychophysiological insomnia  - stable. Continue xanax occasionally  - sleep hygiene also discussed   - ALPRAZolam 0.5 MG Oral Tab; Take 1 tablet (0.5 mg total) by mouth nightly as needed.  Dispense: 30 tablet; Refill: 1    6. Postmenopausal  - XR DEXA BONE DENSITOMETRY (CPT=77080); Future    7. Vaginal irritation  - continue medicated topical cream that gyne ordered and fu with them  - estradiol 0.1 MG/GM Vaginal Cream; Place 1 g vaginally twice a week.  Dispense: 42.5 g; Refill: 0    8. Screening for cholesterol level  - repeat level ordered    9. Screening for thyroid disorder  - Endocrine Referral - In Network  - TSH already ordered. Would also like to consider seeing endo for second opinion on thyroid management    10. Encounter for screening mammogram for breast cancer  - [facility] RUBINA 2D+3D SCREENING BILAT (CPT=77067/56606); Future    11. Screening for skin cancer  - DERM - INTERNAL       Follow up in 1 year or sooner as needed  The patient indicates understanding of these issues and agrees to the plan.

## 2024-09-16 NOTE — PATIENT INSTRUCTIONS
Continue your current medications    My fitness pal lupillo for nutrition    Start metamucil daily for weight support and cholesterol    Low fat diet and exercise    Get your labs done. You should be fasting for at least 10 hours. If you take a multivitamin with Biotin or any biotin product it should be held for 3 days prior to getting your labs done.     De-stress as able.     Flu shot recommended.      COVID vaccine recommended     Check with your insurance to verify coverage for the Shingrix vaccine for shingles. Also check to see where you can go to get the vaccine. You can also get a price check at the pharmacy instead.      Make an appointment to see gynecology     Mammogram and dexa in April     Make an apt to see dermatology     See endo for thyroid     Follow up in 1 year or sooner as needed

## 2024-09-26 LAB
ABSOLUTE BASOPHILS: 59 CELLS/UL (ref 0–200)
ABSOLUTE EOSINOPHILS: 274 CELLS/UL (ref 15–500)
ABSOLUTE LYMPHOCYTES: 2597 CELLS/UL (ref 850–3900)
ABSOLUTE MONOCYTES: 518 CELLS/UL (ref 200–950)
ABSOLUTE NEUTROPHILS: 3952 CELLS/UL (ref 1500–7800)
ALBUMIN/GLOBULIN RATIO: 1.8 (CALC) (ref 1–2.5)
ALBUMIN: 4.4 G/DL (ref 3.6–5.1)
ALKALINE PHOSPHATASE: 88 U/L (ref 37–153)
ALT: 16 U/L (ref 6–29)
AST: 19 U/L (ref 10–35)
BASOPHILS: 0.8 %
BILIRUBIN, TOTAL: 0.5 MG/DL (ref 0.2–1.2)
BUN: 13 MG/DL (ref 7–25)
CALCIUM: 9.8 MG/DL (ref 8.6–10.4)
CARBON DIOXIDE: 28 MMOL/L (ref 20–32)
CHLORIDE: 106 MMOL/L (ref 98–110)
CHOL/HDLC RATIO: 2.8 (CALC)
CHOLESTEROL, TOTAL: 161 MG/DL
CREATININE: 0.76 MG/DL (ref 0.5–1.05)
EGFR: 89 ML/MIN/1.73M2
EOSINOPHILS: 3.7 %
GLOBULIN: 2.4 G/DL (CALC) (ref 1.9–3.7)
GLUCOSE: 98 MG/DL (ref 65–99)
HDL CHOLESTEROL: 57 MG/DL
HEMATOCRIT: 41.6 % (ref 35–45)
HEMOGLOBIN: 13.2 G/DL (ref 11.7–15.5)
LDL-CHOLESTEROL: 85 MG/DL (CALC)
LYMPHOCYTES: 35.1 %
MCH: 28.9 PG (ref 27–33)
MCHC: 31.7 G/DL (ref 32–36)
MCV: 91.2 FL (ref 80–100)
MONOCYTES: 7 %
MPV: 10.6 FL (ref 7.5–12.5)
NEUTROPHILS: 53.4 %
NON-HDL CHOLESTEROL: 104 MG/DL (CALC)
PLATELET COUNT: 257 THOUSAND/UL (ref 140–400)
POTASSIUM: 4 MMOL/L (ref 3.5–5.3)
PROTEIN, TOTAL: 6.8 G/DL (ref 6.1–8.1)
RDW: 13.1 % (ref 11–15)
RED BLOOD CELL COUNT: 4.56 MILLION/UL (ref 3.8–5.1)
SODIUM: 140 MMOL/L (ref 135–146)
TRIGLYCERIDES: 93 MG/DL
TSH: 4.39 MIU/L (ref 0.4–4.5)
VITAMIN B12: 454 PG/ML (ref 200–1100)
VITAMIN D, 25-OH, TOTAL: 29 NG/ML (ref 30–100)
WHITE BLOOD CELL COUNT: 7.4 THOUSAND/UL (ref 3.8–10.8)

## 2024-09-27 DIAGNOSIS — E03.9 ACQUIRED HYPOTHYROIDISM: ICD-10-CM

## 2024-09-27 RX ORDER — LEVOTHYROXINE SODIUM 88 UG/1
TABLET ORAL
COMMUNITY
Start: 2024-09-27 | End: 2024-10-22

## 2024-09-27 RX ORDER — LEVOTHYROXINE SODIUM 75 UG/1
TABLET ORAL
COMMUNITY
Start: 2024-09-27 | End: 2024-10-22

## 2024-09-30 ENCOUNTER — TELEPHONE (OUTPATIENT)
Dept: INTERNAL MEDICINE CLINIC | Facility: CLINIC | Age: 64
End: 2024-09-30

## 2024-09-30 NOTE — TELEPHONE ENCOUNTER
Spoke to patient Friday. patient said they had to call back Friday. [facility] sent.

## 2024-10-01 NOTE — TELEPHONE ENCOUNTER
Spoke to patient. Refill not needed of either dose at this time. Patient will contact us when refill needed.

## 2024-10-22 DIAGNOSIS — E03.9 ACQUIRED HYPOTHYROIDISM: ICD-10-CM

## 2024-10-24 RX ORDER — LEVOTHYROXINE SODIUM 88 UG/1
TABLET ORAL
Qty: 45 TABLET | Refills: 0 | Status: SHIPPED | OUTPATIENT
Start: 2024-10-24

## 2024-10-24 RX ORDER — LEVOTHYROXINE SODIUM 75 UG/1
TABLET ORAL
Qty: 45 TABLET | Refills: 0 | Status: SHIPPED | OUTPATIENT
Start: 2024-10-24

## 2024-10-24 NOTE — TELEPHONE ENCOUNTER
Thyroid Medication Protocol Veaqzh22/22/2024 11:18 AM   Protocol Details TSH in past 12 months    Last TSH value is normal    In person appointment or virtual visit in the past 12 mos or appointment in next 3 mos      Per Trisha last result note:She is on upper limits of normal with her thyroid. She c/o weight at last apt. We can do an alternating dose of 75 mcg every other day and 88 mcg every other day to try to get her close to middle of normal range and repeat TSH in 3 months.     Refills sent.

## 2025-01-17 DIAGNOSIS — E78.00 HYPERCHOLESTEROLEMIA: ICD-10-CM

## 2025-01-17 RX ORDER — ROSUVASTATIN CALCIUM 20 MG/1
20 TABLET, COATED ORAL NIGHTLY
Qty: 90 TABLET | Refills: 2 | Status: SHIPPED | OUTPATIENT
Start: 2025-01-17

## 2025-01-17 NOTE — TELEPHONE ENCOUNTER
Cholesterol Medication Protocol Rlpyca2601/17/2025 03:10 PM   Protocol Details ALT < 80    ALT resulted within past year    Lipid panel within past 12 months    In person appointment or virtual visit in the past 12 mos or appointment in next 3 mos    Medication is active on med list      2. Hypercholesterolemia  - low fat diet, exercise  - continue statin  - get heart scan  No future appointments.

## 2025-01-17 NOTE — TELEPHONE ENCOUNTER
Is this medication prescribed by the [facility] Providers? yes    Did the patient contact the pharmacy directly?:  new pharmacy    Is patient out of meds or supply very low?:  low    Medication Requested:  rosuvastatin Oral Tab     Dose:  20 MG    Is patient requesting a 30 or 90 day supply?:  90    Pharmacy name and phone # or location:  [id number] - [place], [place] - [address] AT [facility] [address], [phone], [phone] [[id number]]     Is the patient due for an appointment?: no  (if so, please schedule appt)    Additional Notes:  none    Please advise the patient refills take up to 72 business hours.

## 2025-01-21 DIAGNOSIS — E03.9 ACQUIRED HYPOTHYROIDISM: ICD-10-CM

## 2025-01-21 RX ORDER — LEVOTHYROXINE SODIUM 75 UG/1
TABLET ORAL
Qty: 45 TABLET | Refills: 0 | Status: SHIPPED | OUTPATIENT
Start: 2025-01-21

## 2025-01-21 RX ORDER — LEVOTHYROXINE SODIUM 88 UG/1
TABLET ORAL
Qty: 45 TABLET | Refills: 0 | Status: SHIPPED | OUTPATIENT
Start: 2025-01-21

## 2025-01-21 NOTE — TELEPHONE ENCOUNTER
Last office visit: 9/16/2024   Protocol: pass   Requested medication(s) are due for refill today: yes  Requested medication(s) are on the active medication list same strength, form, dose/ sig: yes  Requested medication(s) are managed by provider: yes  Patient has already received a courtsey refill: no    NOV: none   Last Labs: 9/25/2024  Asked to Return: 9/16/2025

## 2025-01-31 LAB
TSH: 0.71 MIU/L (ref 0.4–4.5)
VITAMIN B12: 441 PG/ML (ref 200–1100)
VITAMIN D, 25-OH, TOTAL: 29 NG/ML (ref 30–100)

## 2025-02-03 ENCOUNTER — TELEPHONE (OUTPATIENT)
Dept: INTERNAL MEDICINE CLINIC | Facility: CLINIC | Age: 65
End: 2025-02-03

## 2025-02-03 DIAGNOSIS — E03.9 ACQUIRED HYPOTHYROIDISM: ICD-10-CM

## 2025-02-03 RX ORDER — LEVOTHYROXINE SODIUM 88 UG/1
TABLET ORAL
Qty: 45 TABLET | Refills: 0 | Status: SHIPPED | OUTPATIENT
Start: 2025-02-03

## 2025-02-03 RX ORDER — LEVOTHYROXINE SODIUM 75 UG/1
TABLET ORAL
Qty: 45 TABLET | Refills: 0 | Status: SHIPPED | OUTPATIENT
Start: 2025-02-03

## 2025-02-03 NOTE — TELEPHONE ENCOUNTER
Spoke to pt regarding labs, see the result note. She understands her results and recommendations. She needed levothyroxine sent to [facility] instead, order sent. She is wondering if she can spread out her TSH recheck to 6 months d/t insurance and cost. She is able to get 1 physical per calendar year with insurance, she is wondering if she can do the TSH with other labs that are needed for annual physical exam in 6 months instead of 3? She understands symptoms to monitor for in the meantime and to let us know if she has any to recheck sooner. Please advise thanks!

## 2025-04-09 ENCOUNTER — OFFICE VISIT (OUTPATIENT)
Facility: CLINIC | Age: 65
End: 2025-04-09
Payer: COMMERCIAL

## 2025-04-09 VITALS
HEIGHT: 60 IN | BODY MASS INDEX: 29.45 KG/M2 | DIASTOLIC BLOOD PRESSURE: 54 MMHG | SYSTOLIC BLOOD PRESSURE: 98 MMHG | HEART RATE: 64 BPM | WEIGHT: 150 LBS

## 2025-04-09 DIAGNOSIS — Z12.4 SCREENING FOR CERVICAL CANCER: ICD-10-CM

## 2025-04-09 DIAGNOSIS — Z01.411 ENCOUNTER FOR WELL WOMAN EXAM WITH ABNORMAL FINDINGS: Primary | ICD-10-CM

## 2025-04-09 DIAGNOSIS — R10.2 VULVAR PAIN: ICD-10-CM

## 2025-04-09 PROCEDURE — 87624 HPV HI-RISK TYP POOLED RSLT: CPT | Performed by: OBSTETRICS & GYNECOLOGY

## 2025-04-09 PROCEDURE — 99459 PELVIC EXAMINATION: CPT | Performed by: OBSTETRICS & GYNECOLOGY

## 2025-04-09 PROCEDURE — 88175 CYTOPATH C/V AUTO FLUID REDO: CPT | Performed by: OBSTETRICS & GYNECOLOGY

## 2025-04-09 PROCEDURE — 3078F DIAST BP <80 MM HG: CPT | Performed by: OBSTETRICS & GYNECOLOGY

## 2025-04-09 PROCEDURE — 99214 OFFICE O/P EST MOD 30 MIN: CPT | Performed by: OBSTETRICS & GYNECOLOGY

## 2025-04-09 PROCEDURE — 3008F BODY MASS INDEX DOCD: CPT | Performed by: OBSTETRICS & GYNECOLOGY

## 2025-04-09 PROCEDURE — 99396 PREV VISIT EST AGE 40-64: CPT | Performed by: OBSTETRICS & GYNECOLOGY

## 2025-04-09 PROCEDURE — 3074F SYST BP LT 130 MM HG: CPT | Performed by: OBSTETRICS & GYNECOLOGY

## 2025-04-09 RX ORDER — CLOBETASOL PROPIONATE 0.5 MG/G
OINTMENT TOPICAL
Qty: 60 G | Refills: 1 | Status: SHIPPED | OUTPATIENT
Start: 2025-04-09

## 2025-04-09 RX ORDER — NORTRIPTYLINE HYDROCHLORIDE 25 MG/1
25 CAPSULE ORAL NIGHTLY
Qty: 30 CAPSULE | Refills: 1 | Status: SHIPPED | OUTPATIENT
Start: 2025-04-09

## 2025-04-09 NOTE — H&P
[facility]  Obstetrics and Gynecology   History & Physical  Est    Chief complaint:   Chief Complaint   Patient presents with    Wellness Visit      WWE   Last pap 01/10/2022    Other     Pt c/o of bump on the labia of her vagina. Pt c/o painful. Pt stated she noticed it about 2 weeks ago       Subjective:     HPI: Mercedes Hardy is a 64 year old  with No LMP recorded. Patient is postmenopausal.    Here for WWE & painful lump of labia x 2 weeks - was more irritation but more distracting now  Chaperone declines.     Last OV with me 1/10/22 - WWE with c/o vaginal dryness & \"pimple in vaginal area\" - was very big a few weeks ago - was very painful & big. Then has gotten smaller. Didn't notice any drainage. Has been either 3-4 years since last GYN exam. C/o vaginal dryness & irritation. Occasional sexually active. Not enjoyable - the discomfort. noticed a subcutaneous lesion under skin superior to clitoris that was inflamed but appears better now VULVA: normal, no lesions visible. Patient had to point out her issue - subcutaneous, superior to clitoris, mobile, round, rubbery feeling, less than 1 cm in size, probably about 7-8 mm. Cannot see this lesion even with manipulation of the skin. I can only feel it. Pretty deep in subcutaneous tissue.     22 - Visit with Dr. Torres - c/o vulvar pain and discomfort, believes it is caused by small skin tag on left labia, would like it to be removed. Small skin tag on left labia major removed with scalpel, good hemostasis. Rx Premarin cream. Path was fibroepithelial polyp    As of today, 2025   Here for WWE & vulvar issue x 2 weeks. Was just annoying. Now very focal area of pain. Even smaller than a grain of rice. Says she thinks this was the area at which Dr. Torres removed the previous lesion in 2022. No discharge that she can tell. Has tried some aloe based lotion.Tried cool compress, heat.  Didn't help. About the same size. Wearing loose  clothes & trying to stand for work.   Wearing liners - wasn't sure if maybe irritation related.   VB - No  Discharge - No  Premarin cream - used it for awhile. Helped somewhat.   Revaree - had tried.     PCP: Maxine Correa MD    Review of Systems   Constitutional:  Positive for unexpected weight change.        Has gained some weight   Gastrointestinal:  Negative for blood in stool, constipation and diarrhea.        Sometimes some gurgling sensation in the abdomen. Sometimes feeling like it is hard to get things moving through. BM daily - consistency varies.    Genitourinary:  Positive for genital sores. Negative for dyspareunia, dysuria, frequency, hematuria, pelvic pain, vaginal bleeding and vaginal discharge.        Some leaking/dribble a small amount with laugh/cough/sneeze.   Wears a liner     Premarin cream has helped with intercourse.      Skin:         Lesion on vulva - painful x 2 wk   Breasts - no changes      GYN Hx  Menopause in her 40s. Was on HRT for maybe 1 year or so.   No PMB  H/o infertility issues & saw several specialists   H/o bicornuate uterus  H/o fibroids - had laparoscopies. Had some follow up US that showed fibroids shrinking.   STDs: no   Pap & HPV neg 1/10/22. No abn pap  Mammogram neg 24. Ordered per PCP   Colonoscopy 2021 - adenomatous polyp.  Recall 7 years   DEXA scan - osteopenia - not sure exact year. Vit D, calcium, weight bearing activities. Ordered per PCP     OB History:  OB History    Para Term  AB Living   0 0 0 0 0 0   SAB IAB Ectopic Multiple Live Births   0 0 0 0 0       Meds:  Current Outpatient Medications on File Prior to Visit   Medication Sig Dispense Refill    levothyroxine 75 MCG Oral Tab Take 1 tablet (75 mcg total) by mouth before breakfast every other day alternating with 88 mcg of levothyroxine. 45 tablet 0    levothyroxine 88 MCG Oral Tab Take 1 tablet (88 mcg total) by mouth before breakfast every other day alternating with 75 mcg of  levothyroxine. 45 tablet 0    rosuvastatin 20 MG Oral Tab Take 1 tablet (20 mg total) by mouth nightly. 90 tablet 2    estradiol 0.1 MG/GM Vaginal Cream Place 1 g vaginally twice a week. 42.5 g 0    ALPRAZolam 0.5 MG Oral Tab Take 1 tablet (0.5 mg total) by mouth nightly as needed. 30 tablet 1    Ascorbic Acid (VITAMIN C OR) Take  by mouth daily as needed.      Cholecalciferol (VITAMIN D) 1000 UNITS Oral Tab Take 1 tablet by mouth daily.      MULTIVITAMINS OR TABS 1 TABLET DAILY      CALCIUM & MAGNESIUM CARBONATES OR Take 1 tablet by mouth daily.      ADVIL 200 MG OR TABS Take 3 tablets (600 mg total) by mouth every 8 (eight) hours as needed.       No current facility-administered medications on file prior to visit.       All:  No Known Allergies    PMH:  Past Medical History[1]     PSH:  Past Surgical History:   Procedure Laterality Date    Colonoscopy      Colonoscopy & polypectomy  12/06/2021    Tubular adenoma of transverse colon removed. Recall 7 years     Laparoscopy procedure unlisted  1995    Fibroids    Laparoscopy procedure unlisted  1989    Fibroids    Laparoscopy,pelvic,biopsy      Laparoscopy,remove myoma  1984    Dx bicornuate uterus, myomectomy     Other surgical history  2004    b/l Lasix     Remove tonsils/adenoids,<14 y/o  1967    Sinus surgery    12/2004    B/L endoscopic sinus surery, a nasal septal reconstruction, and a submucous resection of the inferior turbinate    Skin excision Left 05/23/2022    Dr. Torres - c/o vulvar pain and discomfort, believes it is caused by small skin tag on left labia, would like it to be removed. Small skin tag on left labia major removed with scalpel, good hemostasis. Path was fibroepithelial polyp    Tonsillectomy  1970       Social History:  Social History     Socioeconomic History    Marital status: Single     Spouse name: Not on file    Number of children: Not on file    Years of education: Not on file    Highest education level: Not on file   Occupational  History    Not on file   Tobacco Use    Smoking status: Never    Smokeless tobacco: Never   Vaping Use    Vaping status: Never Used   Substance and Sexual Activity    Alcohol use: Yes     Alcohol/week: 1.0 standard drink of alcohol     Types: 1 Glasses of wine per week     Comment: 1 drink per week    Drug use: No    Sexual activity: Yes     Partners: Male   Other Topics Concern     Service Not Asked    Blood Transfusions Not Asked    Caffeine Concern No    Occupational Exposure Not Asked    Hobby Hazards Not Asked    Sleep Concern Not Asked    Stress Concern No    Weight Concern No    Special Diet No    Back Care Not Asked    Exercise Yes    Bike Helmet Not Asked    Seat Belt Yes    Self-Exams Not Asked   Social History Narrative    Not on file     Social Drivers of Health     Food Insecurity: Not on file   Transportation Needs: Not on file   Stress: Not on file   Housing Stability: Not on file        Family History:  Family History   Problem Relation Age of Onset    Heart Attack Father 68    Other (Other) Mother 83        aplastic anemia    Other (hip fracture) Mother     Stroke Mother     Anemia Mother         Aplastic anemia    Diabetes Maternal Grandmother 75    High Cholesterol Brother     Diabetes Brother         unknown    Heart Surgery Sister         heart valve replaced    Diabetes Other         grandmother    Heart Disease Paternal Aunt         CHF    Breast Cancer Neg        Objective:     Vitals:    04/09/25 1305 04/09/25 1340   BP: 122/70 98/54   Pulse: 64    Weight: 150 lb (68 kg)    Height: 60\"          Body mass index is 29.29 kg/m².    Physical Exam  Vitals and nursing note reviewed.   Constitutional:       Appearance: Normal appearance.   HENT:      Head: Normocephalic and atraumatic.   Eyes:      Extraocular Movements: Extraocular movements intact.      Conjunctiva/sclera: Conjunctivae normal.   Neck:      Comments: No thyromegaly  Cardiovascular:      Rate and Rhythm: Normal rate and  regular rhythm.      Heart sounds: No murmur heard.  Pulmonary:      Effort: Pulmonary effort is normal.      Breath sounds: Normal breath sounds.      Comments: Breasts: no masses, no axillary lymphadenopathy  Abdominal:      General: There is no distension.      Palpations: Abdomen is soft. There is no mass.      Tenderness: There is no abdominal tenderness. There is no guarding or rebound.      Hernia: No hernia is present.   Genitourinary:     Comments: VULVA: No gross abnormalities. Patient had to point out her issue - she points to left vulva lateral and slightly inferior to clitoris. There is just slight skin redundancy in this area about 5-8 mm in size (difficult to measure due to redundancy). Skin colored or just slightly hyperpigmented. Soft, no erythema, no drainage. No mass. It is possible this is where the previous fibroepithelial polyp was removed.   +Hemorrhoids  URETHRA: normal  PERINEUM: intact, no lesions  VAGINA: light pink atrophic, no discharge. Stage I uterovaginal prolapse, stage I - II cystocele. Stage I rectocele   CERVIX: atrophic, slightly stenotic external os.   UTERUS: uterus is small, slightly lobular   ADNEXA: normal adnexa in size, nontender and no masses  PELVIC FLOOR: mild hypertonicity, +slight tenderness     Musculoskeletal:      Right lower leg: No edema.      Left lower leg: No edema.   Neurological:      General: No focal deficit present.      Mental Status: She is alert.   Psychiatric:         Mood and Affect: Mood normal.         Behavior: Behavior normal.         Thought Content: Thought content normal.         Judgment: Judgment normal.         Labs:  Lab Results   Component Value Date    WBC 7.4 09/25/2024    RBC 4.56 09/25/2024    HGB 13.2 09/25/2024    HCT 41.6 09/25/2024    MCV 91.2 09/25/2024    MCH 28.9 09/25/2024    MCHC 31.7 (L) 09/25/2024    RDW 13.1 09/25/2024     09/25/2024        Lab Results   Component Value Date    GLU 98 09/25/2024    BUN 13 09/25/2024     BUNCREA SEE NOTE: 09/25/2024    CREATSERUM 0.76 09/25/2024    GFRNAA 96 10/20/2021    GFRAA 111 10/20/2021    CA 9.8 09/25/2024    ALKPHO 88 09/25/2024    AST 19 09/25/2024    ALT 16 09/25/2024    BILT 0.5 09/25/2024    TP 6.8 09/25/2024    ALB 4.4 09/25/2024    GLOBULIN 2.4 09/25/2024    AGRATIO 1.8 09/25/2024     09/25/2024    K 4.0 09/25/2024     09/25/2024    CO2 28 09/25/2024       Lab Results   Component Value Date    CHOLEST 161 09/25/2024    TRIG 93 09/25/2024    HDL 57 09/25/2024    LDL 85 09/25/2024    VLDL 33 10/30/2018    TCHDLRATIO 2.8 09/25/2024    NONHDLC 104 09/25/2024        Lab Results   Component Value Date    T4F 1.6 10/26/2020    TSH 0.71 01/30/2025    TSHT4 6.46 (H) 10/26/2020        Lab Results   Component Value Date    A1C 5.6 10/26/2020       Imaging:  No results found.     Exam: US PELVIC COM AND TRANSVAGINAL NON OB   CPT Code(s): 31798,64481 - TRANSVAGINAL US NON_OB,US EXAM PELVIC COMPLETE     CLINICAL HISTORY: Fibroids (Z86.018). Pelvic cramping (R10.2). Subcentimeter subcutaneous mobile nontender \"rubbery feeling\" midline lesion in the mons pubis superior to the clitoris.     TECHNIQUE: Transabdominal and endovaginal grayscale and color Doppler pelvic ultrasound.     COMPARISON: None.     FINDINGS:   Translabial imaging demonstrates a nonvascular anechoic 10 x 6 x 6 mm cyst with increased through transmission in the region of the patient's \"lump\". 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 The right horn measures approximately 4.2 x 2.0 x 2.6   cm. The left horn measures approximately 4.4 x 2.3 x 3.1 cm. A 1.6 x 2.1 x 1.9 cm heterogeneous hypoechoic fibroid is seen within the left horn in the superior body/fundus medially. No additional uterine lesions are identified. No significant free pelvic    fluid is seen. The endometrium within the right horn measures measures 2 mm thick and there is trace fluid in the endometrial canal. The endometrium of the left horn  measures 3 mm thick.     Right ovary is not definitively identified. The left ovary is unremarkable and measures approximately 1.7 x 1.4 x 1.4 cm. No adnexal mass/lesion is identified.     IMPRESSION:   1. Bicornuate appearance of the uterus. A fibroid is seen within the left horn. If further characterization is warranted clinically MRI could be performed.   2. Small simple appearing cyst in the region of patient's palpable lump.     This report was performed utilizing speech recognition software technology. Despite thorough proofreading, speech recognition errors could escape detection. If a word or phrase is confusing or out of context, please do not hesitate to call for   clarification.     Interpreting Radiologist:     Kory Pina M.D.   Electronically Signed: 2022 02:53 PM   Assessment:     Mercedes Hardy is a 64 year old  female here for WWE, h/o fibroepithelial polyp removal from left vulva per Dr. Torres in . Patient now with 2 weeks of discomfort turning into significant focal pain in that area. On exam initially nothing noted. Patient had to point out an area of redundant appearing skin lateral & inferior to clitoris. There is no mass there. Possible this is recurrence of the fibroepithelial polyp.     Diagnoses and all orders for this visit:    Encounter for well woman exam with abnormal findings    Screening for cervical cancer  -     ThinPrep PAP Smear; Future  -     Hpv Dna  High Risk , Thin Prep Collect; Future    Vulvar pain  -     nortriptyline 25 MG Oral Cap; Take 1 capsule (25 mg total) by mouth nightly.  -     clobetasol 0.05 % External Ointment; Apply 1 thin layer nightly to affected area.           Plan:     Vulvar pain, left, acute   -2025 soft fold of tissue just to the left of the ibis of the labia minora. Approx 5-8 mm in size. Says this is were her fibroepithelial polyp was removed per Dr. Torres in . Pain seems out of proportion with exam. This could be  nerve pain. Will try clobetasol & nortryptline. Ice encouraged. Discussed may need a wider excision to remove the base of the lesion to try to prevent recurrence but could create scar tissue & could worsen pain     Vaginal irritation (1/2022)  -1/2022 atrophy on exam, but may also have yeast component. Can treat empirically for yeast. Rx Diflucan. may mainly be due to atrophy. Revaree (vaginal hyaluronic) sample & info given. Can consider vaginal estrogen  -2022 Rx Premarin cream per Dr. Torres   -4/9/2025 Uses premarin cream and Revaree - switches back & forth. Thinks it is helping. No vaginal dryness or dyspareunia.     Fibroids   -Sign UZAIR for US for fibroids from Dr. Jose R Alba  -1/19/22 Pelvic US Bicornuate uterus is small with endometrium stripe 2 mm & 3 mm in the two horns. Probable fibroid 1.9 cm noted in the left side. Left ovary normal. Right ovary not seen. No free fluid in pelvis. Small (1 cm) simple appearing subcutaneous cyst of the pubic mons.     Vulvar lesion  -2022 subcutaneous, superior to clitoris, mobile, round, rubbery feeling, less than 1 cm in size, probably about 7-8 mm  -Limited US vulva 1/19/22 - Small (1 cm) simple appearing subcutaneous cyst of the pubic mons.     Pap & HPV neg 1/10/22. Up to date. Patient desires pap is done today, 4/9/2025   Breast exam benign 4/9/2025   Mammogram neg 4/9/24. Ordered per PCP   Colonoscopy 12/6/2021 - adenomatous polyp.  Recall 7 years   DEXA scan - osteopenia - not sure exact year. Vit D, calcium, weight bearing activities. 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 Torres due to patient discomfort/pain    Fibroids 1995    5/27/16 Pelvic US - fibroid 14 x 15.79 (mm presumably) - outside records Dr. Alba    Frozen shoulder 08/2014    right shoulder    H/O bone density study 12/08/2016    T score -1.9, osteopenia    H/O pelvic ultrasound 05/27/2016    Fibroid (14.06x15.79)    H/O pelvic ultrasound 05/27/2016    Fibroids 12.06x15.79, uterus length 3.08 uterus height 2.29 per Jose R Alba MD records    H/O pelvic ultrasound 11/30/2012    Uterus Length 28mm, Uterus width 23mm, per Jose R Alba MD records    H/O pelvic ultrasound 05/29/2014    Normal    Hemorrhoids 12/2021    Discovered during colonscopy    High cholesterol     History of pelvic ultrasound 01/19/2022 1/19/22 Pelvic US - bicornuate uterus with 2 mm & 3 mm endometrial stripes. Probable 1.9 cm fibroid left side. Normal left ovary. Right ovary not seen. No free fluid. Pubic mons 1 cm simple appearing subcutaneous cyst.     Hypercholesterolemia     Hyperlipidemia     Hypothyroidism     Infertility management     Bicornuate uterus, Fibroids     Infertility, female     Liver lesion 07/13/2011    small hyperechoic lesion w/in the liver     Loose body, joint 06/28/2009    Midepigastric pain     Osteopenia 12/08/2016    Bone density test, T score -1.9    Osteopenia 2011    Pain in joints     Meniscus tears, rotator cuff tear    Pap smear for cervical cancer screening 01/10/2022    Pap & HPV negative.     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     Wears glasses     Reading glasses

## 2025-04-10 LAB — HPV E6+E7 MRNA CVX QL NAA+PROBE: NEGATIVE

## 2025-04-12 ENCOUNTER — HOSPITAL ENCOUNTER (OUTPATIENT)
Dept: MAMMOGRAPHY | Facility: HOSPITAL | Age: 65
Discharge: HOME | End: 2025-04-12
Attending: NURSE PRACTITIONER
Payer: COMMERCIAL

## 2025-04-12 DIAGNOSIS — Z12.31 ENCOUNTER FOR SCREENING MAMMOGRAM FOR BREAST CANCER: ICD-10-CM

## 2025-04-12 PROCEDURE — 77067 SCR MAMMO BI INCL CAD: CPT | Performed by: NURSE PRACTITIONER

## 2025-04-12 PROCEDURE — 77063 BREAST TOMOSYNTHESIS BI: CPT | Performed by: NURSE PRACTITIONER

## 2025-05-18 DIAGNOSIS — E03.9 ACQUIRED HYPOTHYROIDISM: ICD-10-CM

## 2025-05-20 RX ORDER — LEVOTHYROXINE SODIUM 88 UG/1
88 TABLET ORAL EVERY OTHER DAY
Qty: 45 TABLET | Refills: 0 | Status: SHIPPED | OUTPATIENT
Start: 2025-05-20

## 2025-05-20 NOTE — TELEPHONE ENCOUNTER
Last OV relevant to medication: 9/16/24 ; lab results  Last refill date: 2/3/25     #/refills: 45/0   When pt was asked to return for OV:   Follow up in 1 year or sooner as needed    Upcoming appt/reason: No future appointments.   Was pt informed of any over due labs: overdue; mcm sent     T4, FREE (ng/dL)   Date Value   10/26/2020 1.6     TSH (mIU/L)   Date Value   01/30/2025 0.71     TSH W/REFLEX TO FT4 (mIU/L)   Date Value   10/26/2020 6.46 (H)

## 2025-07-17 ENCOUNTER — OFFICE VISIT (OUTPATIENT)
Dept: INTERNAL MEDICINE CLINIC | Facility: CLINIC | Age: 65
End: 2025-07-17
Payer: COMMERCIAL

## 2025-07-17 VITALS
TEMPERATURE: 98 F | BODY MASS INDEX: 27.83 KG/M2 | SYSTOLIC BLOOD PRESSURE: 112 MMHG | HEART RATE: 60 BPM | OXYGEN SATURATION: 99 % | WEIGHT: 139.88 LBS | HEIGHT: 59.49 IN | RESPIRATION RATE: 16 BRPM | DIASTOLIC BLOOD PRESSURE: 60 MMHG

## 2025-07-17 DIAGNOSIS — F51.04 PSYCHOPHYSIOLOGICAL INSOMNIA: ICD-10-CM

## 2025-07-17 DIAGNOSIS — E03.9 ACQUIRED HYPOTHYROIDISM: ICD-10-CM

## 2025-07-17 DIAGNOSIS — E78.00 HYPERCHOLESTEROLEMIA: ICD-10-CM

## 2025-07-17 DIAGNOSIS — Z00.00 ENCOUNTER FOR ANNUAL PHYSICAL EXAM: Primary | ICD-10-CM

## 2025-07-17 DIAGNOSIS — R53.83 FATIGUE, UNSPECIFIED TYPE: ICD-10-CM

## 2025-07-17 DIAGNOSIS — E66.3 OVER WEIGHT: ICD-10-CM

## 2025-07-17 NOTE — PATIENT INSTRUCTIONS
B12 500 mcg daily for supplement for weight support    Check with your insurance to verify coverage for the Shingrix vaccine for shingles. Also check to see where you can go to get the vaccine. You can also get a price check at the pharmacy instead.      PCV 21 vaccine recommended - get a price check at pharmacy     Get your labs done. You should be fasting for at least 10 hours. If you take a multivitamin with Biotin or any biotin product it should be held for 3 days prior to getting your labs done.     Follow up in 1 year with Dr. Correa or sooner as needed     VISIT SUMMARY:  Today, we discussed your ongoing weight management, thyroid concerns, knee and ankle pain, fatigue, and cholesterol management. We reviewed your current exercise and diet regimen, thyroid medication, and supplements. We also talked about potential low-impact exercises and the importance of regular monitoring and follow-up.    YOUR PLAN:  WEIGHT MANAGEMENT: You have been experiencing difficulty maintaining your weight under 140 pounds despite regular exercise and diet tracking.  -Continue your current exercise regimen, being cautious with your knee and ankle.  -Fine-tune your calorie intake by reducing in small increments if your weight loss plateaus.  -Use Metamucil as needed for appetite control.  -Consider swimming as a low-impact exercise option.  -Monitor your weight weekly and adjust strategies as needed.    THYROID DISORDER: Your thyroid levels are being monitored regularly, and you are currently alternating doses based on previous measurements.  -Continue your current thyroid medication regimen.  -Monitor your thyroid levels with upcoming blood work.    FATIGUE: You are experiencing fatigue, particularly in the evenings. Potential causes include thyroid function, vitamin levels, and sleep quality.  -Start B12 supplementation at 500 mcg daily.  -Increase vitamin D supplementation by 1000 IU daily.  -Monitor your energy levels and  consider a sleep study if fatigue persists.    HYPERLIPIDEMIA: Your cholesterol is currently managed with rosuvastatin, with a day off on Sundays.  -Continue taking rosuvastatin with a day off on Sundays.  -Monitor your cholesterol levels with upcoming blood work.    GENERAL HEALTH MAINTENANCE: We discussed immunizations, including flu, COVID, shingles, and pneumonia vaccines.  -Consider getting the shingles vaccine, which requires two doses, two to six months apart.  -Consider the new pneumonia vaccine and check your insurance coverage.  -Continue routine screenings and preventative health measures.    FOLLOW-UP: Regular follow-up and monitoring of various health parameters are important.  -Schedule a follow-up appointment with Dr. Correa   -Coordinate blood work timing with your insurance changes.  -Consider an endocrinology referral if thyroid concerns persist.    Contains text generated by Valerio

## 2025-07-18 ENCOUNTER — TELEPHONE (OUTPATIENT)
Dept: INTERNAL MEDICINE CLINIC | Facility: CLINIC | Age: 65
End: 2025-07-18

## 2025-07-18 DIAGNOSIS — Z00.00 PHYSICAL EXAM: ICD-10-CM

## 2025-07-18 DIAGNOSIS — E55.9 VITAMIN D DEFICIENCY: ICD-10-CM

## 2025-07-18 DIAGNOSIS — Z13.29 SCREENING FOR THYROID DISORDER: ICD-10-CM

## 2025-07-18 DIAGNOSIS — Z13.220 SCREENING FOR CHOLESTEROL LEVEL: Primary | ICD-10-CM

## 2025-07-18 NOTE — TELEPHONE ENCOUNTER
Patient called because she wanted to let Trisha know that she verified with her insurance that she can get her labs done anytime. She was wondering if Trisha could send her annual labs to [facility]. Thanks!

## 2025-07-24 DIAGNOSIS — F51.04 PSYCHOPHYSIOLOGICAL INSOMNIA: ICD-10-CM

## 2025-07-29 LAB
ABSOLUTE BASOPHILS: 37 CELLS/UL (ref 0–200)
ABSOLUTE EOSINOPHILS: 198 CELLS/UL (ref 15–500)
ABSOLUTE LYMPHOCYTES: 2021 CELLS/UL (ref 850–3900)
ABSOLUTE MONOCYTES: 502 CELLS/UL (ref 200–950)
ABSOLUTE NEUTROPHILS: 3441 CELLS/UL (ref 1500–7800)
ALBUMIN/GLOBULIN RATIO: 1.9 (CALC) (ref 1–2.5)
ALBUMIN: 4.5 G/DL (ref 3.6–5.1)
ALKALINE PHOSPHATASE: 74 U/L (ref 37–153)
ALT: 14 U/L (ref 6–29)
AST: 20 U/L (ref 10–35)
BASOPHILS: 0.6 %
BILIRUBIN, TOTAL: 0.6 MG/DL (ref 0.2–1.2)
BUN: 13 MG/DL (ref 7–25)
CALCIUM: 9.8 MG/DL (ref 8.6–10.4)
CARBON DIOXIDE: 26 MMOL/L (ref 20–32)
CHLORIDE: 106 MMOL/L (ref 98–110)
CHOL/HDLC RATIO: 2.8 (CALC)
CHOLESTEROL, TOTAL: 150 MG/DL
CREATININE: 0.73 MG/DL (ref 0.5–1.05)
EGFR: 92 ML/MIN/1.73M2
EOSINOPHILS: 3.2 %
GLOBULIN: 2.4 G/DL (CALC) (ref 1.9–3.7)
GLUCOSE: 92 MG/DL (ref 65–99)
HDL CHOLESTEROL: 53 MG/DL
HEMATOCRIT: 41.4 % (ref 35–45)
HEMOGLOBIN: 13.2 G/DL (ref 11.7–15.5)
LDL-CHOLESTEROL: 80 MG/DL (CALC)
LYMPHOCYTES: 32.6 %
MCH: 29.5 PG (ref 27–33)
MCHC: 31.9 G/DL (ref 32–36)
MCV: 92.4 FL (ref 80–100)
MONOCYTES: 8.1 %
MPV: 10.2 FL (ref 7.5–12.5)
NEUTROPHILS: 55.5 %
NON-HDL CHOLESTEROL: 97 MG/DL (CALC)
PLATELET COUNT: 235 THOUSAND/UL (ref 140–400)
POTASSIUM: 4.1 MMOL/L (ref 3.5–5.3)
PROTEIN, TOTAL: 6.9 G/DL (ref 6.1–8.1)
RDW: 12.8 % (ref 11–15)
RED BLOOD CELL COUNT: 4.48 MILLION/UL (ref 3.8–5.1)
SODIUM: 142 MMOL/L (ref 135–146)
TRIGLYCERIDES: 88 MG/DL
TSH: 1.05 MIU/L (ref 0.4–4.5)
VITAMIN D, 25-OH, TOTAL: 35 NG/ML (ref 30–100)
WHITE BLOOD CELL COUNT: 6.2 THOUSAND/UL (ref 3.8–10.8)

## 2025-07-29 RX ORDER — ALPRAZOLAM 0.5 MG
0.5 TABLET ORAL NIGHTLY PRN
Qty: 30 TABLET | Refills: 1 | Status: SHIPPED | OUTPATIENT
Start: 2025-07-29

## 2025-07-31 DIAGNOSIS — E78.00 HYPERCHOLESTEROLEMIA: ICD-10-CM

## 2025-08-01 RX ORDER — ROSUVASTATIN CALCIUM 20 MG/1
20 TABLET, COATED ORAL NIGHTLY
Qty: 90 TABLET | Refills: 1 | Status: SHIPPED | OUTPATIENT
Start: 2025-08-01

## 2025-08-28 ENCOUNTER — HOSPITAL ENCOUNTER (OUTPATIENT)
Dept: BONE DENSITY | Age: 65
Discharge: HOME | End: 2025-08-28
Attending: NURSE PRACTITIONER

## 2025-08-28 DIAGNOSIS — Z78.0 POSTMENOPAUSAL: ICD-10-CM

## 2025-08-28 PROCEDURE — 77080 DXA BONE DENSITY AXIAL: CPT | Performed by: NURSE PRACTITIONER

## (undated) DIAGNOSIS — E05.90 HYPERTHYROIDISM: ICD-10-CM

## (undated) DIAGNOSIS — E05.90 HYPERTHYROIDISM: Primary | ICD-10-CM

## (undated) NOTE — LETTER
11/09/21    Mercedes Hardy  [address] [place] [address] [place] [id number]    Dear Jelly Clinton,    This is a friendly reminder to complete your Annual Mammogram, Colon Cancer Screening and Labs.   To schedule an appointment to complete your Mammogram, please ca

## (undated) NOTE — LETTER
10/23/20        Mercedes Hardy  [address] [place] [id number]      Dear Nicole Nettles,    This is a friendly reminder to let you know that you have outstanding lab work and are due for a follow up appointment.   During your last visit on 9/23/2

## (undated) NOTE — LETTER
12/04/19        Mercedes Hardy  [address] [address]      Dear Kamryn Desai,    [facility] records indicate that you have outstanding lab work and or testing that was ordered for you and has not yet been completed:        TOSIN CESPEDES 2D+3D SCR

## (undated) NOTE — LETTER
12/06/17      Mercedes Hardy  [address] [place] [address] [place] [facility] [place] [id number]    12/29/1960     Dear Soraida Gomez,    Our records indicate that you have outstanding lab work and or testing that was ordered for you and has not yet been completed:          Lipid P

## (undated) NOTE — LETTER
11/09/21        Mercedes Hardy  [address] [place] [id number]      Dear Maria M Clay,    Our records indicate that you have outstanding lab work and or testing that was ordered for you and has not yet been completed:  Orders Placed This Encou

## (undated) NOTE — MR AVS SNAPSHOT
[address] [id number] [phone]               Thank you for choosing us for your health care visit with Jaziel Mace DO.   We are glad to serve you and happy to provide you with th Where to Get Your Medications      These medications were sent to [address] [place] [address], [address] [place] [address] [phone], [phone]  [address] [facility]     Phone:  [phone]    - benzonatate 100 MG Caps      You c